# Patient Record
Sex: MALE | Race: WHITE | NOT HISPANIC OR LATINO | Employment: FULL TIME | URBAN - METROPOLITAN AREA
[De-identification: names, ages, dates, MRNs, and addresses within clinical notes are randomized per-mention and may not be internally consistent; named-entity substitution may affect disease eponyms.]

---

## 2018-01-15 ENCOUNTER — HOSPITAL ENCOUNTER (INPATIENT)
Facility: HOSPITAL | Age: 58
LOS: 2 days | Discharge: HOME | End: 2018-01-17
Attending: EMERGENCY MEDICINE | Admitting: INTERNAL MEDICINE

## 2018-01-15 ENCOUNTER — APPOINTMENT (OUTPATIENT)
Dept: GENERAL RADIOLOGY | Facility: HOSPITAL | Age: 58
End: 2018-01-15

## 2018-01-15 ENCOUNTER — APPOINTMENT (OUTPATIENT)
Dept: GENERAL RADIOLOGY | Facility: HOSPITAL | Age: 58
End: 2018-01-15
Attending: INTERNAL MEDICINE

## 2018-01-15 DIAGNOSIS — J10.1 INFLUENZA A: Primary | ICD-10-CM

## 2018-01-15 DIAGNOSIS — R13.10 ODYNOPHAGIA: ICD-10-CM

## 2018-01-15 DIAGNOSIS — K20.90 ESOPHAGITIS: ICD-10-CM

## 2018-01-15 PROBLEM — A41.9 SEPSIS: Status: ACTIVE | Noted: 2018-01-15

## 2018-01-15 PROBLEM — R13.0 INABILITY TO SWALLOW: Status: ACTIVE | Noted: 2018-01-15

## 2018-01-15 PROBLEM — K22.70 BARRETT ESOPHAGUS: Status: ACTIVE | Noted: 2018-01-15

## 2018-01-15 LAB
ALBUMIN SERPL-MCNC: 4.4 G/DL (ref 3.2–4.8)
ALBUMIN/GLOB SERPL: 1.7 G/DL (ref 1.5–2.5)
ALP SERPL-CCNC: 132 U/L (ref 25–100)
ALT SERPL W P-5'-P-CCNC: 85 U/L (ref 7–40)
ANION GAP SERPL CALCULATED.3IONS-SCNC: 6 MMOL/L (ref 3–11)
AST SERPL-CCNC: 43 U/L (ref 0–33)
BASOPHILS # BLD AUTO: 0.01 10*3/MM3 (ref 0–0.2)
BASOPHILS NFR BLD AUTO: 0.1 % (ref 0–1)
BILIRUB SERPL-MCNC: 0.8 MG/DL (ref 0.3–1.2)
BILIRUB UR QL STRIP: NEGATIVE
BUN BLD-MCNC: 12 MG/DL (ref 9–23)
BUN/CREAT SERPL: 12 (ref 7–25)
CA-I SERPL ISE-MCNC: 1.22 MMOL/L (ref 1.12–1.32)
CALCIUM SPEC-SCNC: 9.2 MG/DL (ref 8.7–10.4)
CHLORIDE SERPL-SCNC: 105 MMOL/L (ref 99–109)
CLARITY UR: CLEAR
CO2 SERPL-SCNC: 24 MMOL/L (ref 20–31)
COLOR UR: YELLOW
CREAT BLD-MCNC: 1 MG/DL (ref 0.6–1.3)
D-LACTATE SERPL-SCNC: 0.7 MMOL/L (ref 0.5–2)
DEPRECATED RDW RBC AUTO: 43.7 FL (ref 37–54)
EOSINOPHIL # BLD AUTO: 0.01 10*3/MM3 (ref 0–0.3)
EOSINOPHIL NFR BLD AUTO: 0.1 % (ref 0–3)
ERYTHROCYTE [DISTWIDTH] IN BLOOD BY AUTOMATED COUNT: 13.4 % (ref 11.3–14.5)
FLUAV AG NPH QL: POSITIVE
FLUBV AG NPH QL IA: NEGATIVE
GFR SERPL CREATININE-BSD FRML MDRD: 77 ML/MIN/1.73
GLOBULIN UR ELPH-MCNC: 2.6 GM/DL
GLUCOSE BLD-MCNC: 127 MG/DL (ref 70–100)
GLUCOSE UR STRIP-MCNC: NEGATIVE MG/DL
HBA1C MFR BLD: 6.2 % (ref 4.8–5.6)
HCT VFR BLD AUTO: 42.9 % (ref 38.9–50.9)
HGB BLD-MCNC: 14.6 G/DL (ref 13.1–17.5)
HGB UR QL STRIP.AUTO: NEGATIVE
IMM GRANULOCYTES # BLD: 0.02 10*3/MM3 (ref 0–0.03)
IMM GRANULOCYTES NFR BLD: 0.2 % (ref 0–0.6)
KETONES UR QL STRIP: ABNORMAL
LEUKOCYTE ESTERASE UR QL STRIP.AUTO: NEGATIVE
LYMPHOCYTES # BLD AUTO: 0.82 10*3/MM3 (ref 0.6–4.8)
LYMPHOCYTES NFR BLD AUTO: 8.9 % (ref 24–44)
MAGNESIUM SERPL-MCNC: 1.7 MG/DL (ref 1.3–2.7)
MCH RBC QN AUTO: 30.2 PG (ref 27–31)
MCHC RBC AUTO-ENTMCNC: 34 G/DL (ref 32–36)
MCV RBC AUTO: 88.6 FL (ref 80–99)
MONOCYTES # BLD AUTO: 0.9 10*3/MM3 (ref 0–1)
MONOCYTES NFR BLD AUTO: 9.7 % (ref 0–12)
NEUTROPHILS # BLD AUTO: 7.48 10*3/MM3 (ref 1.5–8.3)
NEUTROPHILS NFR BLD AUTO: 81 % (ref 41–71)
NITRITE UR QL STRIP: NEGATIVE
PH UR STRIP.AUTO: 7 [PH] (ref 5–8)
PLATELET # BLD AUTO: 201 10*3/MM3 (ref 150–450)
PMV BLD AUTO: 9.9 FL (ref 6–12)
POTASSIUM BLD-SCNC: 3.5 MMOL/L (ref 3.5–5.5)
PROT SERPL-MCNC: 7 G/DL (ref 5.7–8.2)
PROT UR QL STRIP: NEGATIVE
RBC # BLD AUTO: 4.84 10*6/MM3 (ref 4.2–5.76)
S PYO AG THROAT QL: NEGATIVE
SODIUM BLD-SCNC: 135 MMOL/L (ref 132–146)
SP GR UR STRIP: 1.02 (ref 1–1.03)
TSH SERPL DL<=0.05 MIU/L-ACNC: 0.15 MIU/ML (ref 0.35–5.35)
UROBILINOGEN UR QL STRIP: ABNORMAL
WBC NRBC COR # BLD: 9.24 10*3/MM3 (ref 3.5–10.8)

## 2018-01-15 PROCEDURE — 93005 ELECTROCARDIOGRAM TRACING: CPT | Performed by: NURSE PRACTITIONER

## 2018-01-15 PROCEDURE — 87804 INFLUENZA ASSAY W/OPTIC: CPT | Performed by: EMERGENCY MEDICINE

## 2018-01-15 PROCEDURE — 86645 CMV ANTIBODY IGM: CPT | Performed by: INTERNAL MEDICINE

## 2018-01-15 PROCEDURE — 87880 STREP A ASSAY W/OPTIC: CPT | Performed by: EMERGENCY MEDICINE

## 2018-01-15 PROCEDURE — 86644 CMV ANTIBODY: CPT | Performed by: INTERNAL MEDICINE

## 2018-01-15 PROCEDURE — 25010000002 HYDROMORPHONE PER 4 MG: Performed by: INTERNAL MEDICINE

## 2018-01-15 PROCEDURE — 87040 BLOOD CULTURE FOR BACTERIA: CPT | Performed by: EMERGENCY MEDICINE

## 2018-01-15 PROCEDURE — 25010000002 FLUCONAZOLE PER 200 MG: Performed by: INTERNAL MEDICINE

## 2018-01-15 PROCEDURE — 71046 X-RAY EXAM CHEST 2 VIEWS: CPT

## 2018-01-15 PROCEDURE — 87081 CULTURE SCREEN ONLY: CPT | Performed by: EMERGENCY MEDICINE

## 2018-01-15 PROCEDURE — 99285 EMERGENCY DEPT VISIT HI MDM: CPT

## 2018-01-15 PROCEDURE — 83605 ASSAY OF LACTIC ACID: CPT | Performed by: EMERGENCY MEDICINE

## 2018-01-15 PROCEDURE — 25010000002 PIPERACILLIN SOD-TAZOBACTAM PER 1 G: Performed by: NURSE PRACTITIONER

## 2018-01-15 PROCEDURE — 25010000002 VANCOMYCIN 10 G RECONSTITUTED SOLUTION

## 2018-01-15 PROCEDURE — 25010000002 ONDANSETRON PER 1 MG: Performed by: EMERGENCY MEDICINE

## 2018-01-15 PROCEDURE — 80053 COMPREHEN METABOLIC PANEL: CPT | Performed by: EMERGENCY MEDICINE

## 2018-01-15 PROCEDURE — 25010000002 PROMETHAZINE PER 50 MG: Performed by: FAMILY MEDICINE

## 2018-01-15 PROCEDURE — 25010000002 FENTANYL CITRATE (PF) 100 MCG/2ML SOLUTION: Performed by: EMERGENCY MEDICINE

## 2018-01-15 PROCEDURE — 93010 ELECTROCARDIOGRAM REPORT: CPT | Performed by: INTERNAL MEDICINE

## 2018-01-15 PROCEDURE — 99222 1ST HOSP IP/OBS MODERATE 55: CPT | Performed by: INTERNAL MEDICINE

## 2018-01-15 PROCEDURE — 25010000002 CEFTRIAXONE PER 250 MG: Performed by: INTERNAL MEDICINE

## 2018-01-15 PROCEDURE — 85025 COMPLETE CBC W/AUTO DIFF WBC: CPT | Performed by: EMERGENCY MEDICINE

## 2018-01-15 PROCEDURE — 25010000002 METHYLPREDNISOLONE PER 125 MG: Performed by: INTERNAL MEDICINE

## 2018-01-15 PROCEDURE — 83036 HEMOGLOBIN GLYCOSYLATED A1C: CPT | Performed by: INTERNAL MEDICINE

## 2018-01-15 PROCEDURE — 25010000002 METHYLPREDNISOLONE PER 125 MG: Performed by: EMERGENCY MEDICINE

## 2018-01-15 PROCEDURE — 84443 ASSAY THYROID STIM HORMONE: CPT | Performed by: NURSE PRACTITIONER

## 2018-01-15 PROCEDURE — 87070 CULTURE OTHR SPECIMN AEROBIC: CPT | Performed by: INTERNAL MEDICINE

## 2018-01-15 PROCEDURE — 92610 EVALUATE SWALLOWING FUNCTION: CPT

## 2018-01-15 PROCEDURE — 25010000002 KETOROLAC TROMETHAMINE PER 15 MG: Performed by: EMERGENCY MEDICINE

## 2018-01-15 PROCEDURE — 70360 X-RAY EXAM OF NECK: CPT

## 2018-01-15 PROCEDURE — 81003 URINALYSIS AUTO W/O SCOPE: CPT | Performed by: EMERGENCY MEDICINE

## 2018-01-15 PROCEDURE — 92612 ENDOSCOPY SWALLOW (FEES) VID: CPT

## 2018-01-15 PROCEDURE — 82330 ASSAY OF CALCIUM: CPT | Performed by: NURSE PRACTITIONER

## 2018-01-15 PROCEDURE — 87205 SMEAR GRAM STAIN: CPT | Performed by: INTERNAL MEDICINE

## 2018-01-15 PROCEDURE — 83735 ASSAY OF MAGNESIUM: CPT | Performed by: NURSE PRACTITIONER

## 2018-01-15 RX ORDER — CEFTRIAXONE SODIUM 1 G/50ML
1 INJECTION, SOLUTION INTRAVENOUS EVERY 24 HOURS
Status: DISCONTINUED | OUTPATIENT
Start: 2018-01-16 | End: 2018-01-17 | Stop reason: HOSPADM

## 2018-01-15 RX ORDER — PANTOPRAZOLE SODIUM 40 MG/10ML
40 INJECTION, POWDER, LYOPHILIZED, FOR SOLUTION INTRAVENOUS EVERY 12 HOURS SCHEDULED
Status: DISCONTINUED | OUTPATIENT
Start: 2018-01-15 | End: 2018-01-17 | Stop reason: HOSPADM

## 2018-01-15 RX ORDER — POTASSIUM CHLORIDE 750 MG/1
40 CAPSULE, EXTENDED RELEASE ORAL AS NEEDED
Status: DISCONTINUED | OUTPATIENT
Start: 2018-01-15 | End: 2018-01-17 | Stop reason: HOSPADM

## 2018-01-15 RX ORDER — ACETAMINOPHEN 650 MG/1
650 SUPPOSITORY RECTAL ONCE
Status: COMPLETED | OUTPATIENT
Start: 2018-01-15 | End: 2018-01-15

## 2018-01-15 RX ORDER — FAMOTIDINE 10 MG/ML
10 INJECTION, SOLUTION INTRAVENOUS EVERY 12 HOURS PRN
Status: DISCONTINUED | OUTPATIENT
Start: 2018-01-15 | End: 2018-01-17 | Stop reason: HOSPADM

## 2018-01-15 RX ORDER — HYDROMORPHONE HYDROCHLORIDE 1 MG/ML
0.5 INJECTION, SOLUTION INTRAMUSCULAR; INTRAVENOUS; SUBCUTANEOUS
Status: DISCONTINUED | OUTPATIENT
Start: 2018-01-15 | End: 2018-01-17 | Stop reason: HOSPADM

## 2018-01-15 RX ORDER — OSELTAMIVIR PHOSPHATE 6 MG/ML
75 FOR SUSPENSION ORAL EVERY 12 HOURS SCHEDULED
Status: DISCONTINUED | OUTPATIENT
Start: 2018-01-15 | End: 2018-01-17 | Stop reason: HOSPADM

## 2018-01-15 RX ORDER — ACETAMINOPHEN 650 MG/1
650 SUPPOSITORY RECTAL EVERY 4 HOURS PRN
Status: DISCONTINUED | OUTPATIENT
Start: 2018-01-15 | End: 2018-01-17 | Stop reason: HOSPADM

## 2018-01-15 RX ORDER — ONDANSETRON 2 MG/ML
4 INJECTION INTRAMUSCULAR; INTRAVENOUS EVERY 6 HOURS PRN
Status: DISCONTINUED | OUTPATIENT
Start: 2018-01-15 | End: 2018-01-17 | Stop reason: HOSPADM

## 2018-01-15 RX ORDER — ONDANSETRON 2 MG/ML
4 INJECTION INTRAMUSCULAR; INTRAVENOUS ONCE
Status: COMPLETED | OUTPATIENT
Start: 2018-01-15 | End: 2018-01-15

## 2018-01-15 RX ORDER — SODIUM CHLORIDE 0.9 % (FLUSH) 0.9 %
10 SYRINGE (ML) INJECTION AS NEEDED
Status: DISCONTINUED | OUTPATIENT
Start: 2018-01-15 | End: 2018-01-17 | Stop reason: HOSPADM

## 2018-01-15 RX ORDER — KETOROLAC TROMETHAMINE 15 MG/ML
15 INJECTION, SOLUTION INTRAMUSCULAR; INTRAVENOUS ONCE
Status: COMPLETED | OUTPATIENT
Start: 2018-01-15 | End: 2018-01-15

## 2018-01-15 RX ORDER — PROMETHAZINE HYDROCHLORIDE 12.5 MG/1
12.5 TABLET ORAL EVERY 6 HOURS PRN
Status: DISCONTINUED | OUTPATIENT
Start: 2018-01-15 | End: 2018-01-17 | Stop reason: HOSPADM

## 2018-01-15 RX ORDER — POTASSIUM CHLORIDE 1.5 G/1.77G
40 POWDER, FOR SOLUTION ORAL AS NEEDED
Status: DISCONTINUED | OUTPATIENT
Start: 2018-01-15 | End: 2018-01-17 | Stop reason: HOSPADM

## 2018-01-15 RX ORDER — PANTOPRAZOLE SODIUM 40 MG/10ML
40 INJECTION, POWDER, LYOPHILIZED, FOR SOLUTION INTRAVENOUS EVERY 12 HOURS SCHEDULED
Status: DISCONTINUED | OUTPATIENT
Start: 2018-01-15 | End: 2018-01-15

## 2018-01-15 RX ORDER — ACETAMINOPHEN 500 MG
1000 TABLET ORAL ONCE
Status: DISCONTINUED | OUTPATIENT
Start: 2018-01-15 | End: 2018-01-15

## 2018-01-15 RX ORDER — DOCUSATE SODIUM 100 MG/1
100 CAPSULE, LIQUID FILLED ORAL 2 TIMES DAILY PRN
Status: DISCONTINUED | OUTPATIENT
Start: 2018-01-15 | End: 2018-01-17 | Stop reason: HOSPADM

## 2018-01-15 RX ORDER — METHYLPREDNISOLONE SODIUM SUCCINATE 125 MG/2ML
125 INJECTION, POWDER, LYOPHILIZED, FOR SOLUTION INTRAMUSCULAR; INTRAVENOUS ONCE
Status: COMPLETED | OUTPATIENT
Start: 2018-01-15 | End: 2018-01-15

## 2018-01-15 RX ORDER — POTASSIUM CHLORIDE 7.45 MG/ML
10 INJECTION INTRAVENOUS
Status: DISCONTINUED | OUTPATIENT
Start: 2018-01-15 | End: 2018-01-17 | Stop reason: HOSPADM

## 2018-01-15 RX ORDER — FLUCONAZOLE 2 MG/ML
200 INJECTION, SOLUTION INTRAVENOUS DAILY
Status: DISCONTINUED | OUTPATIENT
Start: 2018-01-15 | End: 2018-01-17 | Stop reason: HOSPADM

## 2018-01-15 RX ORDER — SODIUM CHLORIDE 0.9 % (FLUSH) 0.9 %
1-10 SYRINGE (ML) INJECTION AS NEEDED
Status: DISCONTINUED | OUTPATIENT
Start: 2018-01-15 | End: 2018-01-17 | Stop reason: HOSPADM

## 2018-01-15 RX ORDER — ALUMINA, MAGNESIA, AND SIMETHICONE 2400; 2400; 240 MG/30ML; MG/30ML; MG/30ML
15 SUSPENSION ORAL ONCE
Status: COMPLETED | OUTPATIENT
Start: 2018-01-15 | End: 2018-01-15

## 2018-01-15 RX ORDER — FENTANYL CITRATE 50 UG/ML
50 INJECTION, SOLUTION INTRAMUSCULAR; INTRAVENOUS
Status: COMPLETED | OUTPATIENT
Start: 2018-01-15 | End: 2018-01-15

## 2018-01-15 RX ORDER — BISACODYL 5 MG/1
5 TABLET, DELAYED RELEASE ORAL DAILY PRN
Status: DISCONTINUED | OUTPATIENT
Start: 2018-01-15 | End: 2018-01-17 | Stop reason: HOSPADM

## 2018-01-15 RX ORDER — CEFTRIAXONE SODIUM 1 G/50ML
1 INJECTION, SOLUTION INTRAVENOUS DAILY
Status: DISCONTINUED | OUTPATIENT
Start: 2018-01-15 | End: 2018-01-15

## 2018-01-15 RX ORDER — PROMETHAZINE HYDROCHLORIDE 25 MG/ML
12.5 INJECTION, SOLUTION INTRAMUSCULAR; INTRAVENOUS EVERY 6 HOURS PRN
Status: DISCONTINUED | OUTPATIENT
Start: 2018-01-15 | End: 2018-01-17 | Stop reason: HOSPADM

## 2018-01-15 RX ORDER — MAGNESIUM SULFATE HEPTAHYDRATE 40 MG/ML
2 INJECTION, SOLUTION INTRAVENOUS AS NEEDED
Status: DISCONTINUED | OUTPATIENT
Start: 2018-01-15 | End: 2018-01-17 | Stop reason: HOSPADM

## 2018-01-15 RX ORDER — METHYLPREDNISOLONE SODIUM SUCCINATE 125 MG/2ML
60 INJECTION, POWDER, LYOPHILIZED, FOR SOLUTION INTRAMUSCULAR; INTRAVENOUS EVERY 12 HOURS SCHEDULED
Status: DISCONTINUED | OUTPATIENT
Start: 2018-01-15 | End: 2018-01-15

## 2018-01-15 RX ORDER — ONDANSETRON 4 MG/1
4 TABLET, FILM COATED ORAL EVERY 6 HOURS PRN
Status: DISCONTINUED | OUTPATIENT
Start: 2018-01-15 | End: 2018-01-17 | Stop reason: HOSPADM

## 2018-01-15 RX ORDER — MAGNESIUM SULFATE HEPTAHYDRATE 40 MG/ML
4 INJECTION, SOLUTION INTRAVENOUS AS NEEDED
Status: DISCONTINUED | OUTPATIENT
Start: 2018-01-15 | End: 2018-01-17 | Stop reason: HOSPADM

## 2018-01-15 RX ORDER — OMEPRAZOLE 40 MG/1
40 CAPSULE, DELAYED RELEASE ORAL DAILY
COMMUNITY
End: 2019-05-13 | Stop reason: SDUPTHER

## 2018-01-15 RX ORDER — VANCOMYCIN HYDROCHLORIDE 1 G/200ML
1000 INJECTION, SOLUTION INTRAVENOUS EVERY 12 HOURS
Status: DISCONTINUED | OUTPATIENT
Start: 2018-01-16 | End: 2018-01-15

## 2018-01-15 RX ORDER — SODIUM CHLORIDE 9 MG/ML
125 INJECTION, SOLUTION INTRAVENOUS CONTINUOUS
Status: DISCONTINUED | OUTPATIENT
Start: 2018-01-15 | End: 2018-01-17 | Stop reason: HOSPADM

## 2018-01-15 RX ADMIN — METHYLPREDNISOLONE SODIUM SUCCINATE 125 MG: 125 INJECTION, POWDER, FOR SOLUTION INTRAMUSCULAR; INTRAVENOUS at 05:16

## 2018-01-15 RX ADMIN — ALUMINUM HYDROXIDE, MAGNESIUM HYDROXIDE, AND DIMETHICONE 15 ML: 400; 400; 40 SUSPENSION ORAL at 05:18

## 2018-01-15 RX ADMIN — FENTANYL CITRATE 50 MCG: 50 INJECTION INTRAMUSCULAR; INTRAVENOUS at 06:08

## 2018-01-15 RX ADMIN — HYDROMORPHONE HYDROCHLORIDE 0.5 MG: 2 INJECTION INTRAMUSCULAR; INTRAVENOUS; SUBCUTANEOUS at 12:53

## 2018-01-15 RX ADMIN — ACETAMINOPHEN 650 MG: 650 SUPPOSITORY RECTAL at 02:27

## 2018-01-15 RX ADMIN — FENTANYL CITRATE 50 MCG: 50 INJECTION INTRAMUSCULAR; INTRAVENOUS at 02:23

## 2018-01-15 RX ADMIN — SODIUM CHLORIDE 2448 ML: 9 INJECTION, SOLUTION INTRAVENOUS at 02:18

## 2018-01-15 RX ADMIN — FENTANYL CITRATE 50 MCG: 50 INJECTION INTRAMUSCULAR; INTRAVENOUS at 06:35

## 2018-01-15 RX ADMIN — SODIUM CHLORIDE 125 ML/HR: 9 INJECTION, SOLUTION INTRAVENOUS at 17:26

## 2018-01-15 RX ADMIN — HYDROMORPHONE HYDROCHLORIDE 0.5 MG: 2 INJECTION INTRAMUSCULAR; INTRAVENOUS; SUBCUTANEOUS at 10:36

## 2018-01-15 RX ADMIN — PANTOPRAZOLE SODIUM 40 MG: 40 INJECTION, POWDER, FOR SOLUTION INTRAVENOUS at 10:36

## 2018-01-15 RX ADMIN — LIDOCAINE HYDROCHLORIDE 15 ML: 20 SOLUTION ORAL; TOPICAL at 05:18

## 2018-01-15 RX ADMIN — PANTOPRAZOLE SODIUM 40 MG: 40 INJECTION, POWDER, FOR SOLUTION INTRAVENOUS at 20:43

## 2018-01-15 RX ADMIN — FLUCONAZOLE 200 MG: 200 INJECTION, SOLUTION INTRAVENOUS at 17:27

## 2018-01-15 RX ADMIN — METHYLPREDNISOLONE SODIUM SUCCINATE 60 MG: 125 INJECTION, POWDER, FOR SOLUTION INTRAMUSCULAR; INTRAVENOUS at 12:03

## 2018-01-15 RX ADMIN — OSELTAMIVIR PHOSPHATE 75 MG: 6 POWDER, FOR SUSPENSION ORAL at 20:43

## 2018-01-15 RX ADMIN — VANCOMYCIN HYDROCHLORIDE 1750 MG: 10 INJECTION, POWDER, LYOPHILIZED, FOR SOLUTION INTRAVENOUS at 10:36

## 2018-01-15 RX ADMIN — TAZOBACTAM SODIUM AND PIPERACILLIN SODIUM 3.38 G: 375; 3 INJECTION, SOLUTION INTRAVENOUS at 12:03

## 2018-01-15 RX ADMIN — HYDROMORPHONE HYDROCHLORIDE 0.5 MG: 2 INJECTION INTRAMUSCULAR; INTRAVENOUS; SUBCUTANEOUS at 15:23

## 2018-01-15 RX ADMIN — KETOROLAC TROMETHAMINE 15 MG: 15 INJECTION, SOLUTION INTRAMUSCULAR; INTRAVENOUS at 02:21

## 2018-01-15 RX ADMIN — OSELTAMIVIR PHOSPHATE 75 MG: 6 POWDER, FOR SUSPENSION ORAL at 15:24

## 2018-01-15 RX ADMIN — CEFTRIAXONE SODIUM 1 G: 1 INJECTION, SOLUTION INTRAVENOUS at 10:36

## 2018-01-15 RX ADMIN — ONDANSETRON 4 MG: 2 INJECTION, SOLUTION INTRAMUSCULAR; INTRAVENOUS at 02:20

## 2018-01-15 RX ADMIN — MAGNESIUM SULFATE HEPTAHYDRATE 4 G: 40 INJECTION, SOLUTION INTRAVENOUS at 15:24

## 2018-01-15 RX ADMIN — PROMETHAZINE HYDROCHLORIDE 12.5 MG: 25 INJECTION INTRAMUSCULAR; INTRAVENOUS at 19:34

## 2018-01-15 RX ADMIN — HYDROMORPHONE HYDROCHLORIDE 0.5 MG: 2 INJECTION INTRAMUSCULAR; INTRAVENOUS; SUBCUTANEOUS at 17:26

## 2018-01-15 RX ADMIN — SODIUM CHLORIDE 125 ML/HR: 9 INJECTION, SOLUTION INTRAVENOUS at 08:08

## 2018-01-15 RX ADMIN — TOPICAL ANESTHETIC 2 SPRAY: 200 SPRAY DENTAL; PERIODONTAL at 05:24

## 2018-01-15 NOTE — ED PROVIDER NOTES
Subjective   HPI Comments: Kayode Cline is a 57 y.o. male with a hx of barretts esophagus who presents to the ED with c/o sore throat with onset 3 days ago. The patient reports that this has been worsening since onset and he is having difficulty speaking, swallowing, and breathing as a result. He began having a fever tonight at 1800 which prompted his visit to the ED. The patient also complains of some nausea, and a cough which exacerbates his sore throat, but denies myalgias, vomiting, or any other acute complaints at this time.     Patient is a 57 y.o. male presenting with sore throat.   History provided by:  Patient  Sore Throat   Location:  Generalized  Quality:  Sore  Severity:  Moderate  Onset quality:  Gradual  Duration:  3 days  Timing:  Constant  Progression:  Worsening  Chronicity:  New  Relieved by:  None tried  Worsened by:  Swallowing, drinking and eating (Coughing)  Ineffective treatments:  None tried  Associated symptoms: chills, fever, shortness of breath, trouble swallowing and voice change        Review of Systems   Constitutional: Positive for chills and fever.   HENT: Positive for sore throat, trouble swallowing and voice change.    Respiratory: Positive for shortness of breath.    Gastrointestinal: Positive for nausea. Negative for vomiting.   Musculoskeletal: Negative for myalgias.   All other systems reviewed and are negative.      Past Medical History:   Diagnosis Date   • Delgado's esophagus        No Known Allergies    Past Surgical History:   Procedure Laterality Date   • ENDOSCOPY         History reviewed. No pertinent family history.    Social History     Social History   • Marital status:      Spouse name: N/A   • Number of children: N/A   • Years of education: N/A     Social History Main Topics   • Smoking status: Never Smoker   • Smokeless tobacco: None   • Alcohol use No   • Drug use: No   • Sexual activity: Not Asked     Other Topics Concern   • None     Social History  Narrative   • None         Objective   Physical Exam   Constitutional: He is oriented to person, place, and time. He appears well-developed and well-nourished. No distress.   The patient appears uncomfortable.   HENT:   Head: Normocephalic and atraumatic.   Right Ear: External ear normal.   Nose: Nose normal.   Mouth/Throat: Posterior oropharyngeal erythema present.   Cerumen impaction in the left external canal making it difficult to visualize the TM   Eyes: Conjunctivae are normal. No scleral icterus.   Neck: Normal range of motion. Neck supple.   Mild tenderness with anterior cervical palpation. No significant lymphadenopathy.   Cardiovascular: Regular rhythm, normal heart sounds and intact distal pulses.  Tachycardia present.    No murmur heard.  Pulmonary/Chest: Effort normal and breath sounds normal. No respiratory distress.   Abdominal: Soft. Bowel sounds are normal. There is no tenderness (No epigastric tenderness).   Musculoskeletal: Normal range of motion. He exhibits no edema.   Normal extremities.   Neurological: He is alert and oriented to person, place, and time.   Skin: Skin is warm and dry.   Psychiatric: He has a normal mood and affect. His behavior is normal.   Nursing note and vitals reviewed.      Procedures         ED Course  ED Course     Recent Results (from the past 24 hour(s))   Comprehensive Metabolic Panel    Collection Time: 01/15/18  2:25 AM   Result Value Ref Range    Glucose 127 (H) 70 - 100 mg/dL    BUN 12 9 - 23 mg/dL    Creatinine 1.00 0.60 - 1.30 mg/dL    Sodium 135 132 - 146 mmol/L    Potassium 3.5 3.5 - 5.5 mmol/L    Chloride 105 99 - 109 mmol/L    CO2 24.0 20.0 - 31.0 mmol/L    Calcium 9.2 8.7 - 10.4 mg/dL    Total Protein 7.0 5.7 - 8.2 g/dL    Albumin 4.40 3.20 - 4.80 g/dL    ALT (SGPT) 85 (H) 7 - 40 U/L    AST (SGOT) 43 (H) 0 - 33 U/L    Alkaline Phosphatase 132 (H) 25 - 100 U/L    Total Bilirubin 0.8 0.3 - 1.2 mg/dL    eGFR Non African Amer 77 >60 mL/min/1.73    Globulin 2.6  gm/dL    A/G Ratio 1.7 1.5 - 2.5 g/dL    BUN/Creatinine Ratio 12.0 7.0 - 25.0    Anion Gap 6.0 3.0 - 11.0 mmol/L   CBC Auto Differential    Collection Time: 01/15/18  2:25 AM   Result Value Ref Range    WBC 9.24 3.50 - 10.80 10*3/mm3    RBC 4.84 4.20 - 5.76 10*6/mm3    Hemoglobin 14.6 13.1 - 17.5 g/dL    Hematocrit 42.9 38.9 - 50.9 %    MCV 88.6 80.0 - 99.0 fL    MCH 30.2 27.0 - 31.0 pg    MCHC 34.0 32.0 - 36.0 g/dL    RDW 13.4 11.3 - 14.5 %    RDW-SD 43.7 37.0 - 54.0 fl    MPV 9.9 6.0 - 12.0 fL    Platelets 201 150 - 450 10*3/mm3    Neutrophil % 81.0 (H) 41.0 - 71.0 %    Lymphocyte % 8.9 (L) 24.0 - 44.0 %    Monocyte % 9.7 0.0 - 12.0 %    Eosinophil % 0.1 0.0 - 3.0 %    Basophil % 0.1 0.0 - 1.0 %    Immature Grans % 0.2 0.0 - 0.6 %    Neutrophils, Absolute 7.48 1.50 - 8.30 10*3/mm3    Lymphocytes, Absolute 0.82 0.60 - 4.80 10*3/mm3    Monocytes, Absolute 0.90 0.00 - 1.00 10*3/mm3    Eosinophils, Absolute 0.01 0.00 - 0.30 10*3/mm3    Basophils, Absolute 0.01 0.00 - 0.20 10*3/mm3    Immature Grans, Absolute 0.02 0.00 - 0.03 10*3/mm3   Influenza Antigen, Rapid - Swab, Nasopharynx    Collection Time: 01/15/18  2:29 AM   Result Value Ref Range    Influenza A Ag, EIA Positive (A) Negative    Influenza B Ag, EIA Negative Negative   Rapid Strep A Screen - Swab, Throat    Collection Time: 01/15/18  2:29 AM   Result Value Ref Range    Strep A Ag Negative Negative   Urinalysis With / Culture If Indicated - Urine, Clean Catch    Collection Time: 01/15/18  3:17 AM   Result Value Ref Range    Color, UA Yellow Yellow, Straw    Appearance, UA Clear Clear    pH, UA 7.0 5.0 - 8.0    Specific Gravity, UA 1.020 1.001 - 1.030    Glucose, UA Negative Negative    Ketones, UA 80 mg/dL (3+) (A) Negative    Bilirubin, UA Negative Negative    Blood, UA Negative Negative    Protein, UA Negative Negative    Leuk Esterase, UA Negative Negative    Nitrite, UA Negative Negative    Urobilinogen, UA 0.2 E.U./dL 0.2 - 1.0 E.U./dL     Note: In  addition to lab results from this visit, the labs listed above may include labs taken at another facility or during a different encounter within the last 24 hours. Please correlate lab times with ED admission and discharge times for further clarification of the services performed during this visit.    XR Chest 2 View   Final Result     No acute findings.      THIS DOCUMENT HAS BEEN ELECTRONICALLY SIGNED BY PRABHJOT ANNE MD        Vitals:    01/15/18 0200 01/15/18 0300 01/15/18 0330 01/15/18 0331   BP: 148/97 123/77 133/84    BP Location:       Patient Position:       Pulse:    111   Resp:   24    Temp:    (!) 102.1 °F (38.9 °C)   TempSrc:       SpO2: 94%  95%    Weight:       Height:         Medications   sodium chloride 0.9 % flush 10 mL (not administered)   sodium chloride 0.9 % bolus 2,448 mL (2,448 mL Intravenous New Bag 1/15/18 0218)   fentaNYL citrate (PF) (SUBLIMAZE) injection 50 mcg (50 mcg Intravenous Given 1/15/18 0223)   ondansetron (ZOFRAN) injection 4 mg (4 mg Intravenous Given 1/15/18 0220)   ketorolac (TORADOL) injection 15 mg (15 mg Intravenous Given 1/15/18 0221)   acetaminophen (TYLENOL) suppository 650 mg (650 mg Rectal Given 1/15/18 0227)     ECG/EMG Results (last 24 hours)     ** No results found for the last 24 hours. **        Pt feels  Generally better following fever control and hydration.  Despite this, his throat pain persists and he remains unable to swallow medications or oral secretions.  No significant erythema and no edema is appreciated on visualization of the posterior pharynx.  I suspect that his esophagus is significantly inflamed and he will require admission for fluid, further evaluation and management.                MDM    Final diagnoses:   Influenza A   Odynophagia   Esophagitis       Documentation assistance provided by estela Feliciano.  Information recorded by the estela was done at my direction and has been verified and validated by me.     Edvin Feliciano  01/15/18  0158       Edvin Feliciano  01/15/18 0353       Dimitri Otto, DO  01/15/18 2053       Dimitri Otto, DO  01/15/18 2054

## 2018-01-15 NOTE — CONSULTS
INFECTIOUS DISEASE CONSULT/INITIAL HOSPITAL VISIT    Kayode Cline  1960  [id number]    Date of Consult: 1/15/2018    Admission Date: 1/15/2018      Requesting Provider: No ref. provider found  Evaluating Physician: Cisco Fung MD    Reason for Consultation: Influenza     History of present illness:    Patient is a 57 y.o. male who presented to the ED with complaints of sore throat, cough, and difficulty swallowing.  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 His Influenza A rapid screen  was positive. Admitting labs without leukocytosis,  And he has been febrile with a maximum temperature of 102.4.  Chest xray was clear.  Vancomycin and Rocephin initiated followed by intravenous Zosyn and we were consulted for antibiotic management.  He has been having difficulty swallowing Tamiflu tablets and Tamiflu liquid.    Past Medical History:   Diagnosis Date   • Delgado's esophagus    • Inability to swallow 1/15/2018       Past Surgical History:   Procedure Laterality Date   • ENDOSCOPY         History reviewed. No pertinent family history.    Social History     Social History   • Marital status:      Spouse name: N/A   • Number of children: N/A   • Years of education: N/A     Occupational History   • Not on file.     Social History Main Topics   • Smoking status: Never Smoker   • Smokeless tobacco: Never Used   • Alcohol use No   • Drug use: No   • Sexual activity: Defer     Other Topics Concern   • Not on file     Social History Narrative   • No narrative on file       No Known Allergies      Medication:    Current Facility-Administered Medications:   •  acetaminophen (TYLENOL) suppository 650 mg, 650 mg, Rectal, Q4H PRN, Agata Hammond, QAMAR  •  bisacodyl (DULCOLAX) EC tablet 5 mg, 5 mg, Oral, Daily PRN, QAMAR Soria  •  [START ON 1/16/2018] cefTRIAXone (ROCEPHIN) IVPB 1 g, 1 g,  Intravenous, Q24H, Darrick Yu MD  •  docusate sodium (COLACE) capsule 100 mg, 100 mg, Oral, BID PRN, QAMAR Soria  •  famotidine (PEPCID) injection 10 mg, 10 mg, Intravenous, Q12H PRN, QAMAR Soria  •  fluconazole (DIFLUCAN) IVPB 200 mg, 200 mg, Intravenous, Daily, Darrick Yu MD  •  HYDROmorphone (DILAUDID) injection 0.5 mg, 0.5 mg, Intravenous, Q2H PRN, Darrick Yu MD, 0.5 mg at 01/15/18 1523  •  lidocaine viscous (XYLOCAINE) 2 % mouth solution 5 mL, 5 mL, Mouth/Throat, Q3H PRN, Markus Johnson MD  •  Magnesium Sulfate 2 gram Bolus, followed by 8 gram infusion (total Mg dose 10 grams)- Mg less than or equal to 1mg/dL, 2 g, Intravenous, PRN **OR** Magnesium Sulfate 6 gram Infusion (2 gm x 3) -Mg 1.1 -1.5 mg/dL, 2 g, Intravenous, PRN **OR** magnesium sulfate 4 gram infusion- Mg 1.6-1.9 mg/dL, 4 g, Intravenous, PRN, QAMAR Soria, Last Rate: 25 mL/hr at 01/15/18 1524, 4 g at 01/15/18 1524  •  ondansetron (ZOFRAN) tablet 4 mg, 4 mg, Oral, Q6H PRN **OR** ondansetron (ZOFRAN) injection 4 mg, 4 mg, Intravenous, Q6H PRN, QAMAR Soria  •  oseltamivir (TAMIFLU) 6 MG/ML suspension 75 mg, 75 mg, Oral, Q12H, Darrick Yu MD, 75 mg at 01/15/18 1524  •  pantoprazole (PROTONIX) injection 40 mg, 40 mg, Intravenous, Q12H, Markus Johnson MD, 40 mg at 01/15/18 1036  •  Pharmacy to dose vancomycin, , Does not apply, Continuous PRN, Darrick Yu MD  •  potassium chloride (MICRO-K) CR capsule 40 mEq, 40 mEq, Oral, PRN **OR** potassium chloride (KLOR-CON) packet 40 mEq, 40 mEq, Oral, PRN **OR** potassium chloride 10 mEq in 100 mL IVPB, 10 mEq, Intravenous, Q1H PRN, Agata Hammond, APRN  •  sodium chloride 0.9 % flush 1-10 mL, 1-10 mL, Intravenous, PRN, Agata Hammond, APRN  •  Insert peripheral IV, , , Once **AND** sodium chloride 0.9 % flush 10 mL, 10 mL, Intravenous, PRN, Dimitri Otto,   •  sodium chloride 0.9 % infusion, 125 mL/hr, Intravenous,  Continuous, Markus Johnson MD, Last Rate: 125 mL/hr at 01/15/18 0808, 125 mL/hr at 01/15/18 0808    Antibiotics:  Anti-Infectives     Ordered     Dose/Rate Route Frequency Start Stop    01/15/18 1525  cefTRIAXone (ROCEPHIN) IVPB 1 g     Ordering Provider:  Darrick Yu MD    1 g  100 mL/hr over 30 Minutes Intravenous Every 24 Hours 18 1000 18 0959    01/15/18 1525  fluconazole (DIFLUCAN) IVPB 200 mg     Ordering Provider:  Darrick Yu MD    200 mg  over 60 Minutes Intravenous Daily 01/15/18 1600 18 0859    01/15/18 0828  oseltamivir (TAMIFLU) 6 MG/ML suspension 75 mg     Ordering Provider:  Darrick Yu MD    75 mg Oral Every 12 Hours Scheduled 01/15/18 0900 18 0859    01/15/18 0828  Pharmacy to dose vancomycin     Ordering Provider:  Darrick Yu MD     Does not apply Continuous PRN 01/15/18 0818 18 0817            Review of Systems:  Constitutional--+  Fever, chills or sweats.  Appetite poor , and no malaise. No fatigue.  HEENT-- No new vision, hearing  + sore throat and difficulty swallowing .  No epistaxis or oral sores.  Denies odynophagia . No headache, photophobia or neck stiffness.  CV-- No chest pain, palpitation or syncope  Resp-- No SOB/cough/Hemoptysis  GI- No nausea, vomiting, or diarrhea.  No hematochezia, melena, or hematemesis. Denies jaundice or chronic liver disease.  -- No dysuria, hematuria, or flank pain.  Denies hesitancy, urgency or flank pain.  Lymph- no swollen lymph nodes in neck/axilla or groin.   Heme- No active bruising or bleeding; no Hx of DVT or PE.  MS-- no swelling or pain in the bones or joints of arms/legs.  No new back pain.  Neuro-- No acute focal weakness or numbness in the arms or legs.  No seizures.  Skin--No rashes or lesions      Physical Exam:   Vital Signs  Temp (24hrs), Av °F (37.8 °C), Min:97.7 °F (36.5 °C), Max:102.4 °F (39.1 °C)    Temp  Min: 97.7 °F (36.5 °C)  Max: 102.4 °F (39.1 °C)  BP  Min: 108/99  Max:  158/93  Pulse  Min: 71  Max: 143  Resp  Min: 18  Max: 28  SpO2  Min: 92 %  Max: 98 %    GENERAL: Awake and alert, in no acute distress.   HEENT: Normocephalic, atraumatic.  PERRL. EOMI. No conjunctival injection. No icterus. posterior pharynx with erythema  evidence of thrush or exudate. No evidence of peridontal disease.    NECK: Supple without nuchal rigidity. No mass.  LYMPH: No cervical, axillary or inguinal lymphadenopathy.  HEART: RRR; No murmur, rubs, gallops.   LUNGS: Clear to auscultation bilaterally without wheezing, rales, rhonchi. Normal respiratory effort. Nonlabored. No dullness.  ABDOMEN: Soft, nontender, nondistended. Positive bowel sounds. No rebound or guarding. NO mass or HSM.  EXT:  No cyanosis, clubbing or edema. No cord.  : Genitalia generally unremarkable.  Without Malhotra catheter.  MSK: FROM without joint effusions noted arms/legs.    SKIN: Warm and dry without cutaneous eruptions on Inspection/palpation.    NEURO: Oriented to PPT. No focal deficits on motor/sensory exam at arms/legs.  PSYCHIATRIC: Normal insight and judgement. Cooperative with PE    Laboratory Data      Results from last 7 days  Lab Units 01/15/18  0225   WBC 10*3/mm3 9.24   HEMOGLOBIN g/dL 14.6   HEMATOCRIT % 42.9   PLATELETS 10*3/mm3 201       Results from last 7 days  Lab Units 01/15/18  0225   SODIUM mmol/L 135   POTASSIUM mmol/L 3.5   CHLORIDE mmol/L 105   CO2 mmol/L 24.0   BUN mg/dL 12   CREATININE mg/dL 1.00   GLUCOSE mg/dL 127*   CALCIUM mg/dL 9.2       Results from last 7 days  Lab Units 01/15/18  0225   ALK PHOS U/L 132*   BILIRUBIN mg/dL 0.8   ALT (SGPT) U/L 85*   AST (SGOT) U/L 43*               Results from last 7 days  Lab Units 01/15/18  0629   LACTATE mmol/L 0.7       Pathology from 2013:  Final Diagnosis  ESOPHAGEAL BIOPSIES:       Predominantly gastric-type mucosa without Delgado's metaplasia or  dysplasia.      Estimated Creatinine Clearance: 95.2 mL/min (by C-G formula based on Cr of 1).    Influenza A  positive ( rapid)   Microbiology:                                Radiology:  Imaging Results (last 72 hours)     Procedure Component Value Units Date/Time    XR Chest 2 View [[id number]] Collected:  01/15/18 0149     Updated:  01/15/18 0251    Narrative:       EXAM:    XR Chest, 2 Views    CLINICAL HISTORY:    57 years old, male; Signs and symptoms; Cough and fever; Symptoms not   specified; Additional info: Productive cough    TECHNIQUE:    Frontal and lateral views of the chest.    COMPARISON:    No relevant prior studies available.    FINDINGS:    Lungs: Linear right basilar opacity suggestive of subsegmental atelectasis or   scarring.  Otherwise clear.    Pleural space:  Unremarkable.  No pneumothorax.    Heart:  Unremarkable.    Mediastinum:  Unremarkable.    Bones/joints:  Unremarkable.      Impression:         No acute findings.    THIS DOCUMENT HAS BEEN ELECTRONICALLY SIGNED BY PRABHJOT ANNE MD            Impression:   1.  Influenza A-he has been having difficulty swallowing Tamiflu tablets and suspension.  His dysphagia now appears to be partially improved with steroid therapy.  I discussed his complex situation with clinical pharmacy today.  I investigated the potential use of intravenous Paramavir but it is not currently available.  We will have his Tamiflu placed in thickened liquid and hopefully he will be able to swallow this preparation.  2.  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 ENT consulted, at risk of aspiration.  A throat culture is currently pending to evaluate for beta streptococcal infection.  I will plan to give him intravenous ceftriaxone pending the throat culture result.  At the time of his FEES study today, he was noted to have thrush.  We will place him on intravenous fluconazole.  3.  Fever- secondary to above  4.  Elevated liver enzymes     PLAN/RECOMMENDATIONS:   Thank you for asking us to see Kayode Cline, I recommend the  followin. Throat culture-pending  2.  Intravenous fluconazole  3.  Intravenous ceftriaxone  4.  Dysphagia therapy-evaluation  5.  Tamiflu 75 mg by mouth twice a day-this will be placed in a thickened liquid     Dr. Fung has obtained the history, performed the physical exam and formulated the above treatment plan.     I discussed his complex situation in detail with Dr. Yu today.    Cisco Fung MD  1/15/2018  3:39 PM

## 2018-01-15 NOTE — PROGRESS NOTES
[facility] Medicine Services  PROGRESS NOTE    Patient Name: Kayode Cline  : 1960  MRN: [id number]    Date of Admission: 1/15/2018  Length of Stay: 0  Primary Care Physician: Bong Veloz MD    Subjective   Subjective     CC:  Fever, influenza, possible epiglottis    HPI:  Upper throat very sore, unable to swallow.     Review of Systems   HENT: Positive for sore throat and trouble swallowing.    Respiratory: Negative.    Cardiovascular: Negative.    Gastrointestinal: Negative.    Neurological: Negative.    Psychiatric/Behavioral: Negative.          Otherwise ROS is negative except as mentioned in the HPI.    Objective   Objective     Vital Signs:   Temp:  [99.1 °F (37.3 °C)-102.4 °F (39.1 °C)] 99.1 °F (37.3 °C)  Heart Rate:  [] 81  Resp:  [18-28] 18  BP: (108-158)/(77-99) 128/86        Physical Exam:  Constitutional -no acute distress, in bed  HEENT-posterior pharyngeal erythema bilaterally; no stridor  CV-RRR, S1 S2 normal, no m/r/g  Resp-CTAB, no wheezes, rhonchi or rales  Abd-soft, non-tender, non-distended, normo active bowel sounds  Ext-No lower extremity cyanosis, clubbing or edema bilaterally  Neuro-alert and oriented x 3, speech clear, moves all extremities   Psych-normal affect   Skin- No rash on exposed UE or LE bilaterally    Results Reviewed:  I have personally reviewed current lab, radiology, and data and agree.      Results from last 7 days  Lab Units 01/15/18  022   WBC 10*3/mm3 9.24   HEMOGLOBIN g/dL 14.6   HEMATOCRIT % 42.9   PLATELETS 10*3/mm3 201       Results from last 7 days  Lab Units 01/15/18  0225   SODIUM mmol/L 135   POTASSIUM mmol/L 3.5   CHLORIDE mmol/L 105   CO2 mmol/L 24.0   BUN mg/dL 12   CREATININE mg/dL 1.00   GLUCOSE mg/dL 127*   CALCIUM mg/dL 9.2   ALT (SGPT) U/L 85*   AST (SGOT) U/L 43*     No results found for: BNP  No results found for: PHART    Microbiology Results Abnormal     Procedure Component Value - Date/Time     Influenza Antigen, Rapid - Swab, Nasopharynx [[id number]]  (Abnormal) Collected:  01/15/18 0229    Lab Status:  Final result Specimen:  Swab from Nasopharynx Updated:  01/15/18 0303     Influenza A Ag, EIA Positive (A)     Influenza B Ag, EIA Negative    Rapid Strep A Screen - Swab, Throat [[id number]]  (Normal) Collected:  01/15/18 0229    Lab Status:  Final result Specimen:  Swab from Throat Updated:  01/15/18 0255     Strep A Ag Negative    Narrative:         Test performed by Direct Antigen Testing.          Imaging Results (last 24 hours)     Procedure Component Value Units Date/Time    XR Chest 2 View [[id number]] Collected:  01/15/18 0149     Updated:  01/15/18 0251    Narrative:       EXAM:    XR Chest, 2 Views    CLINICAL HISTORY:    57 years old, male; Signs and symptoms; Cough and fever; Symptoms not   specified; Additional info: Productive cough    TECHNIQUE:    Frontal and lateral views of the chest.    COMPARISON:    No relevant prior studies available.    FINDINGS:    Lungs: Linear right basilar opacity suggestive of subsegmental atelectasis or   scarring.  Otherwise clear.    Pleural space:  Unremarkable.  No pneumothorax.    Heart:  Unremarkable.    Mediastinum:  Unremarkable.    Bones/joints:  Unremarkable.      Impression:         No acute findings.    THIS DOCUMENT HAS BEEN ELECTRONICALLY SIGNED BY PRABHJOT ANNE MD             I have reviewed the medications.    ceftriaxone 1 g Intravenous Daily   oseltamivir 75 mg Oral Q12H   pantoprazole 40 mg Intravenous Q12H   vancomycin 1,750 mg Intravenous Once         Assessment/Plan   Assessment / Plan     Hospital Problem List     * (Principal)Inability to swallow    Influenza A    Sepsis    Odynophagia    Delgado esophagus             Brief Hospital Course to date:  Kayode Cline is a 57 y.o. male with several days sore throat presents with fever and inability to swallow. Patient says pain is in the upper part of his throat. Some drooling noted in the ED.  Fever 102 noted after arrival. Patient is influenza positive.      Assessment & Plan:    Influenza A  - tamiflu elixir    Possible epiglottitis  - several days sore throat, inability to swallow.  - received IV steroids in ED, will add Vancomycin and rocephin.  - requested ENT/ID evaluations.    Elevated LFTs  - etiology unclear, denies EtOH use; fatty liver?  - send hep panel  - will monitor, will need PCP for followup    Low TSH  - check Free T4    Renal insufficiency  - continue fluids, avoid nephrotoxins, CMP am    Hyperglycemia  - check a1c      DVT Prophylaxis:  Mechanical at least until ENT evaluation    CODE STATUS: Full Code    Disposition: I expect the patient to be discharged home in 1-2 days.    Darrick Yu MD  01/15/18  8:40 AM      Addendum 1/15/18 at 3:25 PM    - discussed with ENT, suggested FEES for further evaluation with lateral plain film of neck.    - FEES complete - patient with noted aspiration. Reviewed images with speech pathologist (unfortunately they cannot be downloaded to Epic currently), the images do show significant thrush on back of tongue and ayrtenoids. Epiglottis, however, does not appear red or edemetous.    - Discussed with ID Dr Fung, will continue IV Rocephin, add diflucan IV for thrush. Continue Tamiflu elixir for now with thickeners.          Darrick Yu MD  01/15/18  3:32 PM

## 2018-01-15 NOTE — PROGRESS NOTES
Discharge Planning Assessment  [facility]     Patient Name: Kayode Cline  MRN: [id number]  Today's Date: 1/15/2018    Admit Date: 1/15/2018          Discharge Needs Assessment       01/15/18 1127    Living Environment    Lives With spouse    Living Arrangements house    Stair Railings at Home none    Transportation Available car    Living Environment    Able to Return to Prior Living Arrangements yes    Discharge Needs Assessment    Concerns To Be Addressed no discharge needs identified    Anticipated Changes Related to Illness none    Equipment Currently Used at Home none    Equipment Needed After Discharge none            Discharge Plan       01/15/18 1127    Case Management/Social Work Plan    Plan Kayode Cline lives in [place] with his spouse Thalia Cline. His goal is to return to his home with his spouse.  social work is following for casemanagement needs.      Patient/Family In Agreement With Plan yes        Discharge Placement     No information found                Demographic Summary       01/15/18 1126    Primary Care Physician Information    Name Bong Olverachester            Functional Status       01/15/18 1126    Functional Status Current    Ambulation 0-->independent    Transferring 0-->independent    Toileting 0-->independent    Bathing 0-->independent    Dressing 0-->independent    Eating 0-->independent    Communication 0-->understands/communicates without difficulty    Swallowing (if score 2 or more for any item, consult Rehab Services) 0-->swallows foods/liquids without difficulty    Change in Functional Status Since Onset of Current Illness/Injury no    Functional Status Prior    Ambulation 0-->independent    Transferring 0-->independent    Toileting 0-->independent    Bathing 0-->independent    Dressing 0-->independent    Eating 0-->independent    Communication 0-->understands/communicates without difficulty    Swallowing 0-->swallows foods/liquids without difficulty    IADL    Medications  independent    Meal Preparation independent    Housekeeping independent    Laundry independent    Shopping independent    Oral Care independent    Activity Tolerance    Current Activity Limitations none    Usual Activity Tolerance good    Current Activity Tolerance good    Cognitive/Perceptual/Developmental    Current Mental Status/Cognitive Functioning no deficits noted    Recent Changes in Mental Status/Cognitive Functioning no changes            Psychosocial     None            Abuse/Neglect     None            Legal     None            Substance Abuse     None            Patient Forms     None          KRISH Sevilla

## 2018-01-15 NOTE — CONSULTS
INFECTIOUS DISEASE CONSULT/INITIAL HOSPITAL VISIT    Kayode Cline  1960  [id number]    Date of Consult: 1/15/2018    Admission Date: 1/15/2018      Requesting Provider: No ref. provider found  Evaluating Physician: QAMAR Lowery    Reason for Consultation: Influenza     History of present illness:    Patient is a 57 y.o. male who presented to the ED with complaints of sore throat, cough, and difficulty swallowing.  He states that he was hospitalized 8 years with the same problem, scoped at that time and was found to have Barrettes esophagus, and given Omeprazole. His Influenza A rapid screen  was positive. Admitting labs without leukocytosis,  And he has been febrile with a maximum temperature of 102.4.  Chest xray was clear.  Vancomycin and Rocephin initiated and we were consulted for antibiotic management.     Past Medical History:   Diagnosis Date   • Delgado's esophagus    • Inability to swallow 1/15/2018       Past Surgical History:   Procedure Laterality Date   • ENDOSCOPY         History reviewed. No pertinent family history.    Social History     Social History   • Marital status:      Spouse name: N/A   • Number of children: N/A   • Years of education: N/A     Occupational History   • Not on file.     Social History Main Topics   • Smoking status: Never Smoker   • Smokeless tobacco: Never Used   • Alcohol use No   • Drug use: No   • Sexual activity: Defer     Other Topics Concern   • Not on file     Social History Narrative   • No narrative on file       No Known Allergies      Medication:    Current Facility-Administered Medications:   •  acetaminophen (TYLENOL) suppository 650 mg, 650 mg, Rectal, Q4H PRN, QAMAR Soria  •  bisacodyl (DULCOLAX) EC tablet 5 mg, 5 mg, Oral, Daily PRN, QAMAR Soria  •  cefTRIAXone (ROCEPHIN) IVPB 1 g, 1 g, Intravenous, Daily, Darrick Yu MD, Last Rate: 100 mL/hr at 01/15/18 1036, 1 g at 01/15/18 1036  •  docusate sodium  (COLACE) capsule 100 mg, 100 mg, Oral, BID PRN, QAMAR Soria  •  famotidine (PEPCID) injection 10 mg, 10 mg, Intravenous, Q12H PRN, QAMAR Soria  •  HYDROmorphone (DILAUDID) injection 0.5 mg, 0.5 mg, Intravenous, Q2H PRN, Darrick Yu MD, 0.5 mg at 01/15/18 1036  •  lidocaine viscous (XYLOCAINE) 2 % mouth solution 5 mL, 5 mL, Mouth/Throat, Q3H PRN, Markus Johnson MD  •  Magnesium Sulfate 2 gram Bolus, followed by 8 gram infusion (total Mg dose 10 grams)- Mg less than or equal to 1mg/dL, 2 g, Intravenous, PRN **OR** Magnesium Sulfate 6 gram Infusion (2 gm x 3) -Mg 1.1 -1.5 mg/dL, 2 g, Intravenous, PRN **OR** magnesium sulfate 4 gram infusion- Mg 1.6-1.9 mg/dL, 4 g, Intravenous, PRN, QAMAR Soria  •  ondansetron (ZOFRAN) tablet 4 mg, 4 mg, Oral, Q6H PRN **OR** ondansetron (ZOFRAN) injection 4 mg, 4 mg, Intravenous, Q6H PRN, QAMAR Soria  •  oseltamivir (TAMIFLU) 6 MG/ML suspension 75 mg, 75 mg, Oral, Q12H, Darrick Yu MD  •  pantoprazole (PROTONIX) injection 40 mg, 40 mg, Intravenous, Q12H, Markus Johnson MD, 40 mg at 01/15/18 1036  •  [START ON 1/17/2018] Pharmacy Consult, , Does not apply, Once, Oz Guerrero [facility]  •  Pharmacy to dose vancomycin, , Does not apply, Continuous PRN, Darrick Yu MD  •  potassium chloride (MICRO-K) CR capsule 40 mEq, 40 mEq, Oral, PRN **OR** potassium chloride (KLOR-CON) packet 40 mEq, 40 mEq, Oral, PRN **OR** potassium chloride 10 mEq in 100 mL IVPB, 10 mEq, Intravenous, Q1H PRN, QAMAR Soria  •  sodium chloride 0.9 % flush 1-10 mL, 1-10 mL, Intravenous, PRN, QAMAR Soria  •  Insert peripheral IV, , , Once **AND** sodium chloride 0.9 % flush 10 mL, 10 mL, Intravenous, PRN, Dimitri Otto DO  •  sodium chloride 0.9 % infusion, 125 mL/hr, Intravenous, Continuous, Markus Johnson MD, Last Rate: 125 mL/hr at 01/15/18 0808, 125 mL/hr at 01/15/18 0808  •  [START ON 1/16/2018] vancomycin (VANCOCIN) in  iso-osmotic dextrose IVPB 1 g (premix) 200 mL, 1,000 mg, Intravenous, Q12H, Oz Guerrero RPH  •  vancomycin 1750 mg/500 mL 0.9% NS IVPB ([facility]), 1,750 mg, Intravenous, Once, Mario Irby RPH, 1,750 mg at 01/15/18 1036    Antibiotics:  Anti-Infectives     Ordered     Dose/Rate Route Frequency Start Stop    01/15/18 1043  vancomycin (VANCOCIN) in iso-osmotic dextrose IVPB 1 g (premix) 200 mL     Ordering Provider:  Oz Guerrero RPH    1,000 mg  over 60 Minutes Intravenous Every 12 Hours 01/16/18 0000 01/22/18 2359    01/15/18 0828  oseltamivir (TAMIFLU) 6 MG/ML suspension 75 mg     Ordering Provider:  Darrick Yu MD    75 mg Oral Every 12 Hours Scheduled 01/15/18 0900 01/20/18 0859    01/15/18 0828  cefTRIAXone (ROCEPHIN) IVPB 1 g     Ordering Provider:  Darrick Yu MD    1 g  100 mL/hr over 30 Minutes Intravenous Daily 01/15/18 0900 01/22/18 0859    01/15/18 0832  vancomycin 1750 mg/500 mL 0.9% NS IVPB ([facility])     Ordering Provider:  Mario Irby RPH    1,750 mg  over 120 Minutes Intravenous Once 01/15/18 0834      01/15/18 0828  Pharmacy to dose vancomycin     Ordering Provider:  Darrick Yu MD     Does not apply Continuous PRN 01/15/18 0818 01/22/18 0817            Review of Systems:  Constitutional--+  Fever, chills or sweats.  Appetite poor , and no malaise. No fatigue.  HEENT-- No new vision, hearing  + sore throat and difficulty swallowing .  No epistaxis or oral sores.  Denies odynophagia . No headache, photophobia or neck stiffness.  CV-- No chest pain, palpitation or syncope  Resp-- No SOB/cough/Hemoptysis  GI- No nausea, vomiting, or diarrhea.  No hematochezia, melena, or hematemesis. Denies jaundice or chronic liver disease.  -- No dysuria, hematuria, or flank pain.  Denies hesitancy, urgency or flank pain.  Lymph- no swollen lymph nodes in neck/axilla or groin.   Heme- No active bruising or bleeding; no Hx of DVT or PE.  MS-- no swelling or pain in the bones or joints of  arms/legs.  No new back pain.  Neuro-- No acute focal weakness or numbness in the arms or legs.  No seizures.  Skin--No rashes or lesions      Physical Exam:   Vital Signs  Temp (24hrs), Av.4 °F (38 °C), Min:97.8 °F (36.6 °C), Max:102.4 °F (39.1 °C)    Temp  Min: 97.8 °F (36.6 °C)  Max: 102.4 °F (39.1 °C)  BP  Min: 108/99  Max: 158/93  Pulse  Min: 79  Max: 143  Resp  Min: 18  Max: 28  SpO2  Min: 93 %  Max: 98 %    GENERAL: Awake and alert, in no acute distress.   HEENT: Normocephalic, atraumatic.  PERRL. EOMI. No conjunctival injection. No icterus. posterior pharynx with erythema  evidence of thrush or exudate. No evidence of peridontal disease.    NECK: Supple without nuchal rigidity. No mass.  LYMPH: No cervical, axillary or inguinal lymphadenopathy.  HEART: RRR; No murmur, rubs, gallops.   LUNGS: Clear to auscultation bilaterally without wheezing, rales, rhonchi. Normal respiratory effort. Nonlabored. No dullness.  ABDOMEN: Soft, nontender, nondistended. Positive bowel sounds. No rebound or guarding. NO mass or HSM.  EXT:  No cyanosis, clubbing or edema. No cord.  : Genitalia generally unremarkable.  Without Malhotra catheter.  MSK: FROM without joint effusions noted arms/legs.    SKIN: Warm and dry without cutaneous eruptions on Inspection/palpation.    NEURO: Oriented to PPT. No focal deficits on motor/sensory exam at arms/legs.  PSYCHIATRIC: Normal insight and judgement. Cooperative with PE    Laboratory Data      Results from last 7 days  Lab Units 01/15/18  0225   WBC 10*3/mm3 9.24   HEMOGLOBIN g/dL 14.6   HEMATOCRIT % 42.9   PLATELETS 10*3/mm3 201       Results from last 7 days  Lab Units 01/15/18  022   SODIUM mmol/L 135   POTASSIUM mmol/L 3.5   CHLORIDE mmol/L 105   CO2 mmol/L 24.0   BUN mg/dL 12   CREATININE mg/dL 1.00   GLUCOSE mg/dL 127*   CALCIUM mg/dL 9.2       Results from last 7 days  Lab Units 01/15/18  0225   ALK PHOS U/L 132*   BILIRUBIN mg/dL 0.8   ALT (SGPT) U/L 85*   AST (SGOT) U/L 43*                Results from last 7 days  Lab Units 01/15/18  0629   LACTATE mmol/L 0.7       Pathology from 2013:  Final Diagnosis  ESOPHAGEAL BIOPSIES:       Predominantly gastric-type mucosa without Delgado's metaplasia or  dysplasia.      Estimated Creatinine Clearance: 95.2 mL/min (by C-G formula based on Cr of 1).    Influenza A positive ( rapid)   Microbiology:                                Radiology:  Imaging Results (last 72 hours)     Procedure Component Value Units Date/Time    XR Chest 2 View [[id number]] Collected:  01/15/18 0149     Updated:  01/15/18 0251    Narrative:       EXAM:    XR Chest, 2 Views    CLINICAL HISTORY:    57 years old, male; Signs and symptoms; Cough and fever; Symptoms not   specified; Additional info: Productive cough    TECHNIQUE:    Frontal and lateral views of the chest.    COMPARISON:    No relevant prior studies available.    FINDINGS:    Lungs: Linear right basilar opacity suggestive of subsegmental atelectasis or   scarring.  Otherwise clear.    Pleural space:  Unremarkable.  No pneumothorax.    Heart:  Unremarkable.    Mediastinum:  Unremarkable.    Bones/joints:  Unremarkable.      Impression:         No acute findings.    THIS DOCUMENT HAS BEEN ELECTRONICALLY SIGNED BY PRABHJOT ANNE MD            Impression:   1.  Influenza A:  He is unable to swallow tablets and Tamiflu has been held  2.  Odynophagia, previously diagnosed with a CMV infection in the past and Delgado's esophagus. ENT consulted    3. Fever, secondary to above    PLAN/RECOMMENDATIONS:   Thank you for asking us to see Kayode Cline, I recommend the followin.  Vancomycin, Rocephin  2. ENT evaluation      Dr. Fung has obtained the history, performed the physical exam and formulated the above treatment plan.   Angle Hedrick, QAMAR  1/15/2018  10:44 AM

## 2018-01-15 NOTE — PLAN OF CARE
Problem: Pressure Ulcer Risk (Malcolm Scale) (Adult,Obstetrics,Pediatric)  Goal: Identify Related Risk Factors and Signs and Symptoms  Outcome: Ongoing (interventions implemented as appropriate)   01/15/18 1712   Pressure Ulcer Risk (Malcolm Scale)   Related Risk Factors (Pressure Ulcer Risk (Malcolm Scale)) age extremes;mobility impaired     Goal: Skin Integrity  Outcome: Ongoing (interventions implemented as appropriate)   01/15/18 1712   Pressure Ulcer Risk (Malcolm Scale) (Adult,Obstetrics,Pediatric)   Skin Integrity making progress toward outcome

## 2018-01-15 NOTE — H&P
"[facility] Medicine History and Physical    Primary Care Physician: Bong Veloz MD    Chief complaint   Sore throat.      History of Present Illness  57 Y M, presented to ed, with C/o sore throat, along with cough, with mucus.  Fever in last 12 hrs.  Also started C/o difficulty swallowing his own saliva 2nd to pain in last 12 hrs.  Describing as pain in his neck, no burning sensation, feels like food regurgitating.  No co of congestion, headache or myalgias.    Hx of reflux dis and Delgado's esophagus-last 8 yrs on PPI, last scope year back, which was Normal per pt, fup's with Dr. Retana.    Review of Systems   Complete ROS done, which is negative except as above and HPI.    Past Medical History:       Past Medical History:   Diagnosis Date   • Delgado's esophagus    • Inability to swallow 1/15/2018       Past Surgical History:  Past Surgical History:   Procedure Laterality Date   • ENDOSCOPY         Family History:   No hx of cad, or caner.    Social History:    reports that he has never smoked. He does not have any smokeless tobacco history on file. He reports that he does not drink alcohol or use illicit drugs.    Medications:    (Not in a hospital admission)  No Known Allergies        Physical Exam:    /83  Pulse 90  Temp (!) 100.7 °F (38.2 °C) (Oral)   Resp 18  Ht 175.3 cm (69\")  Wt 81.6 kg (180 lb)  SpO2 93%  BMI 26.58 kg/m2  VITALS-   AS ABOVE.  GENERAL- Not distressed, well nourished.  RS- CTA-BL, ,  No wheezing , no crackles, good effort.  CVS- s1s2 Regular, no murmur.  ABD- soft, non tender, not distended, no organomegaly. BS good.  EXT- no edema. Pulses + .  NEURO- AAO-3, power 5/5 in all ext, no gross sensory deficit, cranial nerves intact.  EYES- Conjunctivae are normal. Pupils are equal, round, and reactive to light. No scleral icterus.   ENT- no external ear nose lesions, mucosa moist,edematous oral , and post pharynx mucosa, no overt erythema, or pus or ulcers noted.  NECK-  No " tracheal deviation present. No thyromegaly present,No cervical lymphadenopathy.  JOINTS/MSK- no deformity, no swelling.  SKIN- no rash , warm to touch.  PSYCHIATRIC-  has a normal mood and affect.Thought content normal.           Results Reviewed:  I have personally reviewed current lab, radiology, and ekg . Data. Case D/w ED attending.  Lab Results   Component Value Date    GLUCOSE 127 (H) 01/15/2018    BUN 12 01/15/2018    CREATININE 1.00 01/15/2018    EGFRIFNONA 77 01/15/2018    BCR 12.0 01/15/2018    CO2 24.0 01/15/2018    CALCIUM 9.2 01/15/2018    ALBUMIN 4.40 01/15/2018    LABIL2 1.7 01/15/2018    AST 43 (H) 01/15/2018    ALT 85 (H) 01/15/2018     Lab Results   Component Value Date    WBC 9.24 01/15/2018    HGB 14.6 01/15/2018    HCT 42.9 01/15/2018    MCV 88.6 01/15/2018     01/15/2018     No results found for: CKTOTAL, CKMB, CKMBINDEX, TROPONINI, TROPONINT    Imaging Results (last 24 hours)     Procedure Component Value Units Date/Time    XR Chest 2 View [[id number]] Collected:  01/15/18 0149     Updated:  01/15/18 0251    Narrative:       EXAM:    XR Chest, 2 Views    CLINICAL HISTORY:    57 years old, male; Signs and symptoms; Cough and fever; Symptoms not   specified; Additional info: Productive cough    TECHNIQUE:    Frontal and lateral views of the chest.    COMPARISON:    No relevant prior studies available.    FINDINGS:    Lungs: Linear right basilar opacity suggestive of subsegmental atelectasis or   scarring.  Otherwise clear.    Pleural space:  Unremarkable.  No pneumothorax.    Heart:  Unremarkable.    Mediastinum:  Unremarkable.    Bones/joints:  Unremarkable.      Impression:         No acute findings.    THIS DOCUMENT HAS BEEN ELECTRONICALLY SIGNED BY PRABHJOT ANNE MD              OLD RECORDS REVIEWED BY ME.          Assessment/Plan    Sorethroat/trouble swallowing  -could not appreciated overy erythema, currently, but pt also got solumedrol 125 mg in ed.  -still cannot swallow saliva-GI  consult requested from ed-with Dr. Durham and likely scope in am.  -iv ppi for now.  -remote hx of throat/esophagus ulceration- send CMV igm.  Influenza A positive-with Neutrophilia.  Rapid strep is negative  -initially was tachycardic, febrile, meets sepsis criteria-fluids started, tamiflu not given since pt cannot swallow.    Mildly elevated LFT.  -as far pt can remember its chronic.  -continue to monitor.    DVT PXL  -jabari's/scds  -Lovenox-none likely pt dc after scope.      I discussed the patients findings and my recommendations with: patient/family.    I believe this patient meets observation status.

## 2018-01-15 NOTE — PLAN OF CARE
Problem: Patient Care Overview (Adult)  Goal: Plan of Care Review  Outcome: Ongoing (interventions implemented as appropriate)   01/15/18 0849   Coping/Psychosocial Response Interventions   Plan Of Care Reviewed With patient   Patient Care Overview   Progress progress towards functional goals is fair   Outcome Evaluation   Outcome Summary/Follow up Plan Complains of soreness to throat, states has improved since coming to ED. In bed resting at this time     Goal: Adult Individualization and Mutuality  Outcome: Ongoing (interventions implemented as appropriate)   01/15/18 0849   Individualization   Patient Specific Interventions Monitor pain q2h and prn       Problem: Infection, Risk/Actual (Adult)  Goal: Identify Related Risk Factors and Signs and Symptoms  Outcome: Ongoing (interventions implemented as appropriate)   01/15/18 0849   Infection, Risk/Actual   Infection, Risk/Actual: Related Risk Factors exposure to microbes   Signs and Symptoms (Infection, Risk/Actual) lab value changes     Goal: Infection Prevention/Resolution  Outcome: Ongoing (interventions implemented as appropriate)   01/15/18 0849   Infection, Risk/Actual (Adult)   Infection Prevention/Resolution making progress toward outcome

## 2018-01-15 NOTE — PROGRESS NOTES
"Pharmacy Consult-Vancomycin Dosing  Kayode Cline is a  57 y.o. male receiving vancomycin therapy.     Indication:Epiglottitis  Consulting Provider: Hospitalist  ID Consult: no    Goal Trough: 10-15 mcg/mL    Current Antimicrobial Therapy  Anti-Infectives       Ordered     Dose/Rate Route Frequency Start Stop      01/15/18 0828  oseltamivir (TAMIFLU) 6 MG/ML suspension 75 mg     Ordering Provider:  Darrick Yu MD    75 mg Oral Every 12 Hours Scheduled 01/15/18 0900 01/20/18 0859    01/15/18 0828  cefTRIAXone (ROCEPHIN) IVPB 1 g     Ordering Provider:  Darrick Yu MD    1 g  100 mL/hr over 30 Minutes Intravenous Daily 01/15/18 0900 01/22/18 0859    01/15/18 0832  vancomycin 1750 mg/500 mL 0.9% NS IVPB ([facility])     Ordering Provider:  Mario Irby RPH    1,750 mg  over 120 Minutes Intravenous Once 01/15/18 0834      01/15/18 0828  Pharmacy to dose vancomycin     Ordering Provider:  Darrick Yu MD     Does not apply Continuous PRN 01/15/18 0818 01/22/18 0817            Allergies  Allergies as of 01/15/2018   • (No Known Allergies)       Labs    Results from last 7 days   Lab Units 01/15/18  0225   BUN mg/dL 12   CREATININE mg/dL 1.00       Results from last 7 days   Lab Units 01/15/18  0225   WBC 10*3/mm3 9.24       Evaluation of Dosing    Ht - 175.3 cm (69\")  Wt - 81.6 kg (180 lb)    Estimated Creatinine Clearance: 95.2 mL/min (by C-G formula based on Cr of 1).    Intake & Output (last 3 days)         01/12 0701 - 01/13 0700 01/13 0701 - 01/14 0700 01/14 0701 - 01/15 0700 01/15 0701 - 01/16 0700      IV Piggyback   2448     Total Intake(mL/kg)   2448 (30)     Net     +2448              Unmeasured Urine Occurrence    1 x            Microbiology and Radiology  Microbiology Results (last 10 days)       Procedure Component Value - Date/Time      Influenza Antigen, Rapid - Swab, Nasopharynx [[id number]]  (Abnormal) Collected:  01/15/18 0229    Lab Status:  Final result Specimen:  Swab from Nasopharynx Updated:  " 01/15/18 0303     Influenza A Ag, EIA Positive (A)     Influenza B Ag, EIA Negative    Rapid Strep A Screen - Swab, Throat [[id number]]  (Normal) Collected:  01/15/18 0229    Lab Status:  Final result Specimen:  Swab from Throat Updated:  01/15/18 0255     Strep A Ag Negative    Narrative:         Test performed by [facility].            Assessment/Plan:   1. Patient received loading dose of 1750 mg IV x 1, on 1/15 at 0834  2. Given CrCl and goal trough of 10-15 mcg/mL, will start regimen of 1000 mg IV q12 at 1/16 at 0000  3. Trough level ordered 1/17 at 1130, 30 minutes prior to 4th dose  4. Pharmacy will continue to monitor renal function, cultures, and clinical status and adjust regimen as necessary.    Thank you,   Lisseth Houser, PharmD Candidate 2018

## 2018-01-15 NOTE — PLAN OF CARE
Problem: Patient Care Overview (Adult)  Goal: Plan of Care Review  Outcome: Ongoing (interventions implemented as appropriate)   01/15/18 5771   Coping/Psychosocial Response Interventions   Plan Of Care Reviewed With patient   Outcome Evaluation   Outcome Summary/Follow up Plan Clinical swallow eval and FEES complete. Severe pharyngeal dysphagia. Edema of the back and base of tongue and arytenoids. White coat on surface of tongue and arytenoids. Pt reported throat pain throughout study. Asp during the swallow w/ thins 2' decreased airway closure and epiglottic inversion. Pen during the swallow w/ pudding that did not clear. Pt with multiple swallows w/ each trial that did not significantly residue residue. Asp after the swallow 2' thin residue mixing with secretions and spilling in from the pyriforms. Pt at high riskl for asp after the swallow w/ pudding. REC: NPO, meds alt route, Per discussion w/ MD, if PO meds needed then give in liquid form. SLP will monitor for improvement of symptoms and re-eval readiness.

## 2018-01-16 LAB
ALBUMIN SERPL-MCNC: 4 G/DL (ref 3.2–4.8)
ALBUMIN/GLOB SERPL: 1.5 G/DL (ref 1.5–2.5)
ALP SERPL-CCNC: 94 U/L (ref 25–100)
ALT SERPL W P-5'-P-CCNC: 54 U/L (ref 7–40)
ANION GAP SERPL CALCULATED.3IONS-SCNC: 9 MMOL/L (ref 3–11)
AST SERPL-CCNC: 27 U/L (ref 0–33)
BASOPHILS # BLD AUTO: 0 10*3/MM3 (ref 0–0.2)
BASOPHILS NFR BLD AUTO: 0 % (ref 0–1)
BILIRUB SERPL-MCNC: 0.5 MG/DL (ref 0.3–1.2)
BUN BLD-MCNC: 14 MG/DL (ref 9–23)
BUN/CREAT SERPL: 20 (ref 7–25)
CALCIUM SPEC-SCNC: 8.5 MG/DL (ref 8.7–10.4)
CHLORIDE SERPL-SCNC: 104 MMOL/L (ref 99–109)
CMV IGG SERPL IA-ACNC: 1.4 U/ML (ref 0–0.59)
CMV IGM SERPL IA-ACNC: <30 AU/ML (ref 0–29.9)
CO2 SERPL-SCNC: 24 MMOL/L (ref 20–31)
CREAT BLD-MCNC: 0.7 MG/DL (ref 0.6–1.3)
DEPRECATED RDW RBC AUTO: 44.1 FL (ref 37–54)
EOSINOPHIL # BLD AUTO: 0 10*3/MM3 (ref 0–0.3)
EOSINOPHIL NFR BLD AUTO: 0 % (ref 0–3)
ERYTHROCYTE [DISTWIDTH] IN BLOOD BY AUTOMATED COUNT: 13.5 % (ref 11.3–14.5)
GFR SERPL CREATININE-BSD FRML MDRD: 117 ML/MIN/1.73
GLOBULIN UR ELPH-MCNC: 2.6 GM/DL
GLUCOSE BLD-MCNC: 137 MG/DL (ref 70–100)
HAV IGM SERPL QL IA: NORMAL
HBV CORE IGM SERPL QL IA: NORMAL
HBV SURFACE AG SERPL QL IA: NORMAL
HCT VFR BLD AUTO: 41.1 % (ref 38.9–50.9)
HCV AB SER DONR QL: NORMAL
HGB BLD-MCNC: 13.5 G/DL (ref 13.1–17.5)
IMM GRANULOCYTES # BLD: 0.04 10*3/MM3 (ref 0–0.03)
IMM GRANULOCYTES NFR BLD: 0.3 % (ref 0–0.6)
LYMPHOCYTES # BLD AUTO: 1.05 10*3/MM3 (ref 0.6–4.8)
LYMPHOCYTES NFR BLD AUTO: 7.7 % (ref 24–44)
MAGNESIUM SERPL-MCNC: 2.3 MG/DL (ref 1.3–2.7)
MCH RBC QN AUTO: 29.4 PG (ref 27–31)
MCHC RBC AUTO-ENTMCNC: 32.8 G/DL (ref 32–36)
MCV RBC AUTO: 89.5 FL (ref 80–99)
MONOCYTES # BLD AUTO: 1 10*3/MM3 (ref 0–1)
MONOCYTES NFR BLD AUTO: 7.3 % (ref 0–12)
NEUTROPHILS # BLD AUTO: 11.61 10*3/MM3 (ref 1.5–8.3)
NEUTROPHILS NFR BLD AUTO: 84.7 % (ref 41–71)
PLATELET # BLD AUTO: 213 10*3/MM3 (ref 150–450)
PMV BLD AUTO: 9.9 FL (ref 6–12)
POTASSIUM BLD-SCNC: 3.9 MMOL/L (ref 3.5–5.5)
PROT SERPL-MCNC: 6.6 G/DL (ref 5.7–8.2)
RBC # BLD AUTO: 4.59 10*6/MM3 (ref 4.2–5.76)
SODIUM BLD-SCNC: 137 MMOL/L (ref 132–146)
WBC NRBC COR # BLD: 13.7 10*3/MM3 (ref 3.5–10.8)

## 2018-01-16 PROCEDURE — 84481 FREE ASSAY (FT-3): CPT | Performed by: INTERNAL MEDICINE

## 2018-01-16 PROCEDURE — 99233 SBSQ HOSP IP/OBS HIGH 50: CPT | Performed by: INTERNAL MEDICINE

## 2018-01-16 PROCEDURE — 80053 COMPREHEN METABOLIC PANEL: CPT | Performed by: INTERNAL MEDICINE

## 2018-01-16 PROCEDURE — 92610 EVALUATE SWALLOWING FUNCTION: CPT

## 2018-01-16 PROCEDURE — 25010000002 CEFTRIAXONE PER 250 MG: Performed by: INTERNAL MEDICINE

## 2018-01-16 PROCEDURE — 94799 UNLISTED PULMONARY SVC/PX: CPT

## 2018-01-16 PROCEDURE — 80074 ACUTE HEPATITIS PANEL: CPT | Performed by: INTERNAL MEDICINE

## 2018-01-16 PROCEDURE — 25010000002 HYDROMORPHONE PER 4 MG: Performed by: INTERNAL MEDICINE

## 2018-01-16 PROCEDURE — 25010000002 FLUCONAZOLE PER 200 MG: Performed by: INTERNAL MEDICINE

## 2018-01-16 PROCEDURE — 85025 COMPLETE CBC W/AUTO DIFF WBC: CPT | Performed by: INTERNAL MEDICINE

## 2018-01-16 PROCEDURE — 83735 ASSAY OF MAGNESIUM: CPT | Performed by: INTERNAL MEDICINE

## 2018-01-16 RX ADMIN — PANTOPRAZOLE SODIUM 40 MG: 40 INJECTION, POWDER, FOR SOLUTION INTRAVENOUS at 09:23

## 2018-01-16 RX ADMIN — OSELTAMIVIR PHOSPHATE 75 MG: 6 POWDER, FOR SUSPENSION ORAL at 21:58

## 2018-01-16 RX ADMIN — HYDROMORPHONE HYDROCHLORIDE 0.5 MG: 2 INJECTION INTRAMUSCULAR; INTRAVENOUS; SUBCUTANEOUS at 04:12

## 2018-01-16 RX ADMIN — SODIUM CHLORIDE 125 ML/HR: 9 INJECTION, SOLUTION INTRAVENOUS at 09:24

## 2018-01-16 RX ADMIN — CEFTRIAXONE SODIUM 1 G: 1 INJECTION, SOLUTION INTRAVENOUS at 10:43

## 2018-01-16 RX ADMIN — FLUCONAZOLE 200 MG: 200 INJECTION, SOLUTION INTRAVENOUS at 09:23

## 2018-01-16 RX ADMIN — HYDROMORPHONE HYDROCHLORIDE 0.5 MG: 2 INJECTION INTRAMUSCULAR; INTRAVENOUS; SUBCUTANEOUS at 10:43

## 2018-01-16 RX ADMIN — SODIUM CHLORIDE 125 ML/HR: 9 INJECTION, SOLUTION INTRAVENOUS at 01:20

## 2018-01-16 RX ADMIN — OSELTAMIVIR PHOSPHATE 75 MG: 6 POWDER, FOR SUSPENSION ORAL at 10:43

## 2018-01-16 RX ADMIN — PANTOPRAZOLE SODIUM 40 MG: 40 INJECTION, POWDER, FOR SOLUTION INTRAVENOUS at 21:58

## 2018-01-16 NOTE — PROGRESS NOTES
[facility] Medicine Services  PROGRESS NOTE    Patient Name: Kayode Cline  : 1960  MRN: [id number]    Date of Admission: 1/15/2018  Length of Stay: 1  Primary Care Physician: Bong Veloz MD    Subjective   Subjective     CC:  Fever, influenza, pharyngitis    HPI:  Throat less sore today, slight cough    Review of Systems   HENT: Positive for sore throat and trouble swallowing.    Respiratory: Negative.    Cardiovascular: Negative.    Gastrointestinal: Negative.    Neurological: Negative.    Psychiatric/Behavioral: Negative.          Otherwise ROS is negative except as mentioned in the HPI.    Objective   Objective     Vital Signs:   Temp:  [97.7 °F (36.5 °C)-99.1 °F (37.3 °C)] 97.9 °F (36.6 °C)  Heart Rate:  [71-94] 94  Resp:  [18] 18  BP: (118-146)/(82-89) 118/84        Physical Exam:  Constitutional -no acute distress, non toxic, in bed  HEENT-NCAT, mucous membranes moist  CV-RRR, S1 S2 normal, no m/r/g  Resp-CTAB, no wheezes, rhonchi or rales  Abd-soft, non-tender, non-distended, normo active bowel sounds  Ext-No lower extremity cyanosis, clubbing or edema bilaterally  Neuro-alert and oriented x 3, speech clear, moves all extremities   Psych-normal affect   Skin- No rash on exposed UE or LE bilaterally    Results Reviewed:  I have personally reviewed current lab, radiology, and data and agree.      Results from last 7 days  Lab Units 01/15/18  0225   WBC 10*3/mm3 9.24   HEMOGLOBIN g/dL 14.6   HEMATOCRIT % 42.9   PLATELETS 10*3/mm3 201       Results from last 7 days  Lab Units 01/15/18  0225   SODIUM mmol/L 135   POTASSIUM mmol/L 3.5   CHLORIDE mmol/L 105   CO2 mmol/L 24.0   BUN mg/dL 12   CREATININE mg/dL 1.00   GLUCOSE mg/dL 127*   CALCIUM mg/dL 9.2   ALT (SGPT) U/L 85*   AST (SGOT) U/L 43*     No results found for: BNP  No results found for: PHART    Microbiology Results Abnormal     Procedure Component Value - Date/Time    Blood Culture - Blood, [[id number]]   (Normal) Collected:  01/15/18 0305    Lab Status:  Preliminary result Specimen:  Blood from Arm, Right Updated:  01/16/18 0331     Blood Culture No growth at 24 hours    Blood Culture - Blood, [[id number]]  (Normal) Collected:  01/15/18 0309    Lab Status:  Preliminary result Specimen:  Blood from Arm, Right Updated:  01/16/18 0331     Blood Culture No growth at 24 hours    Influenza Antigen, Rapid - Swab, Nasopharynx [[id number]]  (Abnormal) Collected:  01/15/18 0229    Lab Status:  Final result Specimen:  Swab from Nasopharynx Updated:  01/15/18 0303     Influenza A Ag, EIA Positive (A)     Influenza B Ag, EIA Negative    Rapid Strep A Screen - Swab, Throat [[id number]]  (Normal) Collected:  01/15/18 0229    Lab Status:  Final result Specimen:  Swab from Throat Updated:  01/15/18 0255     Strep A Ag Negative    Narrative:         Test performed by Direct Antigen Testing.          Imaging Results (last 24 hours)     Procedure Component Value Units Date/Time    XR Neck Soft Tissue [[id number]] Updated:  01/15/18 2094             I have reviewed the medications.    ceftriaxone 1 g Intravenous Q24H   fluconazole 200 mg Intravenous Daily   oseltamivir 75 mg Oral Q12H   pantoprazole 40 mg Intravenous Q12H         Assessment/Plan   Assessment / Plan     Hospital Problem List     * (Principal)Inability to swallow    Influenza A    Sepsis    Odynophagia    Delgado esophagus             Brief Hospital Course to date:  Kayode Cline is a 57 y.o. male with several days sore throat presents with fever and inability to swallow. Patient says pain is in the upper part of his throat. Some drooling noted in the ED. Fever 102 noted after arrival. Patient is influenza positive.      Assessment & Plan:    Influenza A  - tamiflu elixir    Pharyngitis  - on FEES examination, significant thrush on back of tongue and ayrtenoids. Epiglottis, however, does not appear red or edemetous.  - patient started on IV diflucan, consider changing to  nystatin PO once able to swallow    Dysphagia  - NPO currently, Speech to re-evaluate today -- assume as pharyngitis improves, so will swallowing function    Elevated LFTs  - etiology unclear, denies EtOH use; fatty liver?  - send hep panel  - will monitor, will need PCP for followup    Low TSH  - check Free T4    Renal insufficiency  - continue fluids, avoid nephrotoxins, CMP am    Hyperglycemia  - check a1c    DVT Prophylaxis:  Mechanical    CODE STATUS: Full Code    Disposition: I expect the patient to be discharged home in 1-2 days.    Darrick Yu MD  01/16/18  6:30 AM

## 2018-01-16 NOTE — PROGRESS NOTES
[facility] Progress Note    Admission Date: 1/15/2018    Kayode Cline  1960  [id number]    Date: 2018    Meds:    Anti-Infectives     Ordered     Dose/Rate Route Frequency Start Stop    01/15/18 1525  cefTRIAXone (ROCEPHIN) IVPB 1 g     Ordering Provider:  Darrick Yu MD    1 g  100 mL/hr over 30 Minutes Intravenous Every 24 Hours 18 1000 18 0959    01/15/18 1525  fluconazole (DIFLUCAN) IVPB 200 mg     Ordering Provider:  Darrick Yu MD    200 mg  over 60 Minutes Intravenous Daily 01/15/18 1600 18 0859    01/15/18 0828  oseltamivir (TAMIFLU) 6 MG/ML suspension 75 mg     Ordering Provider:  Darrick Yu MD    75 mg Oral Every 12 Hours Scheduled 01/15/18 0900 18 0859    01/15/18 0828  Pharmacy to dose vancomycin     Ordering Provider:  Darrick Yu MD     Does not apply Continuous PRN 01/15/18 0818 18 0817          CC:  Influenza      SUBJECTIVE: 1/15/18: Patient is a 57 y.o. male who presented to the ED with complaints of sore throat, cough, and difficulty swallowing.  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 His Influenza A rapid screen  was positive. Admitting labs without leukocytosis,  And he has been febrile with a maximum temperature of 102.4.  Chest xray was clear.  Vancomycin and Rocephin initiated followed by intravenous Zosyn and we were consulted for antibiotic management.  He has been having difficulty swallowing Tamiflu tablets and Tamiflu liquid.  18;  He is swallowing a little better.  Throat still sore.  Fever better.  Nervous about his business. Coughing quite a bit of bad tasting sputum.He denies any risk factors for HIV infection, including intravenous drug abuse, blood transfusions, and extramarital sexual contact.    ROS:  No f/c/s. No n/v/d. No rash. No new ADR to Abx.     PE:   Vital Signs  Temp (24hrs), Av °F (36.7 °C), Min:97.7  °F (36.5 °C), Max:98.2 °F (36.8 °C)    Temp  Min: 97.7 °F (36.5 °C)  Max: 98.2 °F (36.8 °C)  BP  Min: 118/84  Max: 173/69  Pulse  Min: 70  Max: 94  Resp  Min: 16  Max: 18  SpO2  Min: 90 %  Max: 99 %    GENERAL: Awake and alert, in no acute distress.   HEENT:  No conjunctival injection. No icterus. Oropharynx clear with evidence of thrush/ slight erythema   NECK: Supple, no JVD     HEART: RRR; No murmur, rubs, gallops.   LUNGS: Clear to auscultation bilaterally without wheezing, rales, rhonchi. Normal respiratory effort. Nonlabored. No dullness.  ABDOMEN: Soft, nontender, nondistended. Positive bowel sounds. No rebound or guarding. NO mass or HSM.  EXT:  No cyanosis, clubbing or edema. No cord.  : Genitalia generally unremarkable.  Without Malhotra catheter.  SKIN: Warm and dry without cutaneous eruptions on Inspection/palpation.    NEURO: Alert and oriented x 3, Motor 5/5 bilaterally    Laboratory Data      Results from last 7 days  Lab Units 01/16/18  0622 01/15/18  0225   WBC 10*3/mm3 13.70* 9.24   HEMOGLOBIN g/dL 13.5 14.6   HEMATOCRIT % 41.1 42.9   PLATELETS 10*3/mm3 213 201       Results from last 7 days  Lab Units 01/16/18  0622   SODIUM mmol/L 137   POTASSIUM mmol/L 3.9   CHLORIDE mmol/L 104   CO2 mmol/L 24.0   BUN mg/dL 14   CREATININE mg/dL 0.70   GLUCOSE mg/dL 137*   CALCIUM mg/dL 8.5*       Results from last 7 days  Lab Units 01/16/18  0622   ALK PHOS U/L 94   BILIRUBIN mg/dL 0.5   ALT (SGPT) U/L 54*   AST (SGOT) U/L 27               Results from last 7 days  Lab Units 01/15/18  0629   LACTATE mmol/L 0.7             Estimated Creatinine Clearance: 136 mL/min (by C-G formula based on Cr of 0.7).    Microbiology:  Blood Culture   Date Value Ref Range Status   01/15/2018 No growth at 24 hours  Preliminary         1/15:  Throat culture:  No Beta hemolytic Strep isolated                    Radiology:  Imaging Results (last 72 hours)     Procedure Component Value Units Date/Time    XR Chest 2 View [[id number]]  Collected:  01/15/18 0149     Updated:  01/15/18 0251    Narrative:       EXAM:    XR Chest, 2 Views    CLINICAL HISTORY:    57 years old, male; Signs and symptoms; Cough and fever; Symptoms not   specified; Additional info: Productive cough    TECHNIQUE:    Frontal and lateral views of the chest.    COMPARISON:    No relevant prior studies available.    FINDINGS:    Lungs: Linear right basilar opacity suggestive of subsegmental atelectasis or   scarring.  Otherwise clear.    Pleural space:  Unremarkable.  No pneumothorax.    Heart:  Unremarkable.    Mediastinum:  Unremarkable.    Bones/joints:  Unremarkable.      Impression:         No acute findings.    THIS DOCUMENT HAS BEEN ELECTRONICALLY SIGNED BY PRABHJOT ANNE MD    XR Neck Soft Tissue [[id number]] Collected:  01/16/18 0747     Updated:  01/16/18 0747    Narrative:       EXAMINATION: XR NECK SOFT TISSUE-      INDICATION: Possible epiglottitis.      COMPARISON: None.     FINDINGS:   1. Single lateral view of the cervical spine demonstrates normal  alignment and normal vertebral bodies. The disc space is well preserved.  2. The soft tissues of the neck are normal. There is no evidence of  airway embarrassment of the nasopharynx or oropharynx. Vallecula,  epiglottis and the area epiglottic folds are normal. Vestibule is  unremarkable. Subglottic trachea appears normal. Prevertebral soft  tissues are normal and there is no mass. The soft palate does not  embarrassed the upper airway.           Impression:       Negative lateral view cervical spine and negative soft  tissue view of the neck and airway.     D:  01/15/2018  E:  01/16/2018                 IMPRESSION:  1.  Influenza A-he has been having difficulty swallowing Tamiflu tablets and suspension.  His dysphagia now appears to be partially improved with steroid therapy.  I discussed his complex situation with clinical pharmacy today.  I investigated the potential use of intravenous Paramavir but it is not  currently available.  We will have his Tamiflu placed in thickened liquid and hopefully he will be able to swallow this preparation.  2.  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 ENT consulted, at risk of aspiration.  A throat culture is currently pending to evaluate for beta streptococcal infection.    At the time of his FEES study today, he was noted to have thrush.  We will place him on intravenous fluconazole.  3.  Fever- secondary to influenza  4.  Elevated liver enzymes-improving  5.  Thrush-he received a steroid injection approximately one month ago but was not on any other steroid therapy and has not received other antibiotics.  He does not have any risk factors for HIV infection but it was certainly be reasonable to check HIV serology in this situation.  I discussed this with him today and he does not want to have HIV serology performed for financial reasons.     RECOMMENDATIONS;  1.  Continue Ceftriaxone  2.  Continue Fluconazole  3.  Continue Tamiflu   4.  Possible discharge tomorrow    Dr. Fung has obtained the history, performed the physical exam and formulated the above treatment plan.     I discussed his complex situation with Dr. Yu again today.    Cisco Fung MD  1/16/2018  12:42 PM

## 2018-01-16 NOTE — THERAPY EVALUATION
"Acute Care - Speech Language Pathology   Swallow Re-Evaluation [facility]   Clinical Swallow Evaluation       Patient Name: Kayode Cline  : 1960  MRN: [id number]  Today's Date: 2018               Admit Date: 1/15/2018    Visit Dx:     ICD-10-CM ICD-9-CM   1. Influenza A J10.1 487.1   2. Odynophagia R13.10 787.20   3. Esophagitis K20.9 530.10     Patient Active Problem List   Diagnosis   • Influenza A   • Sepsis   • Odynophagia   • Delgado esophagus   • Inability to swallow     Past Medical History:   Diagnosis Date   • Delgado's esophagus    • Inability to swallow 1/15/2018     Past Surgical History:   Procedure Laterality Date   • ENDOSCOPY            SWALLOW EVALUATION (last 72 hours)      Swallow Evaluation       18 1330 01/15/18 0945             General Information    Patient Profile Review yes  -LS yes  -LS       Subjective Patient Observations alert and cooperative  -LS alert, cooeprative  -LS       Pertinent History Of Current Problem Admit w/ inability to swallow. Pt reports pain when swallowing and feeling food \"stuck in throat.\" He reports he feels that this has happened once before. Dx of influenza, thrush  -LS Admit w/ inability to swallow. Pt reports pain when swallowing and feeling food \"stuck in throat.\" He reports he feels that this has happened once before.  Dx of influenza  -LS       Current Diet Limitations NPO  -LS NPO  -LS       Prior Level of Function- Communication functional in all spheres  -LS functional in all spheres  -LS       Prior Level of Function- Swallowing no diet consistency restrictions  -LS no diet consistency restrictions  -LS       Plans/Goals Discussed With patient;agreed upon  -LS patient;agreed upon  -LS       Barriers to Rehab none identified  -LS none identified  -LS       Clinical Impression    Patient's Goals For Discharge return to PO diet  -LS patient did not state  -LS       Rehab Potential/Prognosis, Swallowing good, to achieve stated therapy " goals  -LS fair, will monitor progress closely  -LS       Criteria for Skilled Therapeutic Interventions Met skilled criteria for dysphagia intervention met  -LS skilled criteria for dysphagia intervention met  -LS       FCM, Swallowing 6-->Level 6  -LS 1-->Level 1  -LS       Therapy Frequency 5 times/wk  -LS 5 times/wk  -LS       SLP Diet Recommendation regular textures;thin liquids  - NPO: unsafe for food/liquid intake  -LS       SLP Rec. for Method of Medication Administration --   Thin liquids when able, Pills whole in thins or pureed  -LS meds via alternate route   Liquid form if PO meds needed.  -       Pain Assessment    Pain Assessment  --   odynophagia  -LS       Oral Motor Structure and Function    Oral Motor Anatomy and Physiology patient demonstrates anatomy and physiology that is WNL  -LS patient demonstrates anatomy and physiology that is WNL  -LS       Dentition Assessment  present and adequate  -LS       Volitional Swallow no difficulties initiating volitional swallow  -LS        Clinical Swallow Exam    Mode of Presentation self fed;cup;straw;spoon  -LS self fed;cup;spoon;straw  -LS       Oral Phase Results intact oral phase without signs of dysfunction  -LS intact oral phase without signs of dysfunction  -LS       Pharyngeal Phase Results  sign/symptoms of pharyngeal impairment  -LS       Summary of Clinical Exam Suspect mild pharyngeal dysphagia. Pt reports signficantly reduced pain and increased ease of spontaneous swallow. Pt presents w/ occasioanlly intermittant throat clear w/ thins liquids. No over s/s of asp w/ pureed and solids. Pt using single swallow w/ each sip/bite. SLP provided edu on risks of dyspahgia and re-instrumental swallow study. With MD agreement, Pt would like to start PO diet and declined instrumental swallow study at this time. REC: regular diet and thin liquids, meds in liquid form when able or whole in pureed. SLP to follow up x1 for diet tolerance.    -LS Suspect  severe pharyngeal dysphagia. Pt describe odynophagia. Multiple swallows with each trial of thins, nectar thick, and pudding. Pt reports globus sensation. Per discussion w/ MD will proceed w/ FEES. REC: NPO, meds via alt route or if PO meds needed, liquid form   -LS       Fiberoptic Endoscopic Swallowing Exam    Risks/Benefits Reviewed  risks/benefits explained;agreed to eval;patient;family  -LS       Positioning Needs for Swallow Exam  upright at 90 degrees  -LS       Anatomy and Physiology    Velopharyngeal  WFL  -LS       Base of Tongue  other (comment)   edema, thick white coating  -LS       Epiglottis  WFL  -LS       Laryngeal Function Breathing  other (comment)   difficult view  -LS       Laryngeal Function Phonation  CNA  -LS       Laryngeal Function Breath Hold  CNA  -LS       Secretions  1- Secretions present around the laryngeal vestibule  -LS       Secretion Description  thin;discolored  -LS       Spontaneous Swallow  frequency reduced;did not clear secretions  -LS       Sensory  senses scope  -LS       Oral-Phary Transit  WFL  -LS       Anatomy Hypopharynx    Observation Anatomic Considerations  anatomic deviation observed  -LS       Observations Comment  Back and base of tongue edematous w/ white coat. Edematous arytenoids with white coating. Epiglottis appeared to be WFL. Limited view of TVF.  -LS       FEES    Mode of Presentation  thin:;pudding:;spoon  -LS       Post Swallow Residue  thin:;pudding:;residue present pyriform sinuses;residue present in valleculae  -LS       Rosenbek's Scale  thin:;pudding:;8-->Level 8  -LS       Response to Aspiration  absent response, silent aspiration  -LS       Attempted Compensatory Maneuvers  bolus size;bolus presentation style  -LS       Pharyngeal Phase Results  impaired pharyngeal phase of swallowing  -LS       FEES Summary  Severe pharyngeal dysphagia. Edema of the back and base of tongue and arytenoids. White coat on surface of tongue and arytenoids. Pt  reported throat pain throughout study. Asp during the swallow w/ thins 2' decreased airway closure and epiglottic inversion. Pen during the swallow w/ pudding that did not clear. Pt with multiple swallows w/ each trial that did not significnatly resude residue. Asp after the swallow 2' thin residue mixing with secretions and spilling in from the pyriforms. Pt at high riskl for asp after the swallow w/ pudding. REC: NPO, meds alt route, Per discussion w/ MD, if PO meds needed then give in liquid form. SLP will monitor for imrpovement of symptoms and re-eval readiness.  -LS         User Key  (r) = Recorded By, (t) = Taken By, (c) = Cosigned By    Initials Name Effective Dates    LS Lin Cast MS [facility]-SLP 06/22/15 -         EDUCATION  The patient has been educated in the following areas:   Dysphagia (Swallowing Impairment) Oral Care/Hydration.    SLP Recommendation and Plan     SLP Diet Recommendation: regular textures, thin liquids     SLP Rec. for Method of Medication Administration:  (Thin liquids when able, Pills whole in thins or pureed)        Criteria for Skilled Therapeutic Interventions Met: skilled criteria for dysphagia intervention met     Rehab Potential/Prognosis, Swallowing: good, to achieve stated therapy goals  Therapy Frequency: 5 times/wk             Plan of Care Review  Plan Of Care Reviewed With: patient  Progress: progress toward functional goals as expected  Outcome Summary/Follow up Plan: Clinical swallow eval complete. Suspect mild pharyngeal dysphagia. Pt reports significantly reduced pain and increased ease of spontaneous swallow. Pt presents w/ occasionally intermittent throat clear w/ thins liquids. No over s/s of asp w/ pureed and solids. Pt using single swallow w/ each sip/bite. SLP provided edu on risks of dysphagia and re-instrumental swallow study. With MD agreement, Pt would like to start PO diet and declined instrumental swallow study at this time. REC: regular diet and thin  liquids, meds in liquid form when able or whole in pureed. SLP to follow up x1 for diet tolerance           Time Calculation:         Time Calculation- SLP       01/16/18 1517          Time Calculation- SLP    SLP Start Time 1330  -LS      SLP Received On 01/16/18  -        User Key  (r) = Recorded By, (t) = Taken By, (c) = Cosigned By    Initials Name Provider Type     Lin Cast, MS CCC-SLP Speech and Language Pathologist          Therapy Charges for Today     Code Description Service Date Service Provider Modifiers Qty    96151469519 HC ST EVAL ORAL PHARYNG SWALLOW 3 1/15/2018 Lin Cast, MS CCC-SLP GN 1    [id number] HC ST FIBEROPTIC ENDO EVAL SWALL 6 1/15/2018 Lin Cast, MS CCC-SLP GN 1    [id number] HC ST EVAL ORAL PHARYNG SWALLOW 3 1/16/2018 Lin Cast, MS CCC-SLP GN 1               Lin Cast, MS GIOVANNI-SLP  1/16/2018

## 2018-01-16 NOTE — PLAN OF CARE
Problem: Patient Care Overview (Adult)  Goal: Plan of Care Review  Outcome: Ongoing (interventions implemented as appropriate)   01/16/18 0053   Coping/Psychosocial Response Interventions   Plan Of Care Reviewed With patient   Patient Care Overview   Progress progress toward functional goals as expected   Outcome Evaluation   Outcome Summary/Follow up Plan Pt states throat pain has eased slightly.       Problem: Infection, Risk/Actual (Adult)  Goal: Identify Related Risk Factors and Signs and Symptoms  Outcome: Ongoing (interventions implemented as appropriate)   01/16/18 0053   Infection, Risk/Actual   Infection, Risk/Actual: Related Risk Factors age extremes   Signs and Symptoms (Infection, Risk/Actual) malaise

## 2018-01-16 NOTE — PLAN OF CARE
Problem: Dysphagia (Adult)  Goal: Functional/Safe Swallow  Outcome: Ongoing (interventions implemented as appropriate)   01/16/18 3937   Dysphagia (Adult)   Functional/Safe Swallow making progress toward outcome

## 2018-01-16 NOTE — PROGRESS NOTES
Discharge Planning Assessment  [facility]     Patient Name: Kayode Cline  MRN: [id number]  Today's Date: 1/16/2018    Admit Date: 1/15/2018          Discharge Needs Assessment       01/16/18 1450    Living Environment    Lives With spouse    Living Arrangements house    Home Accessibility no concerns    Type of Financial/Environmental Concern none    Transportation Available car;family or friend will provide    Living Environment    Quality Of Family Relationships supportive    Able to Return to Prior Living Arrangements yes    Discharge Needs Assessment    Concerns To Be Addressed no discharge needs identified    Anticipated Changes Related to Illness none    Equipment Currently Used at Home none    Equipment Needed After Discharge none            Discharge Plan       01/16/18 1451    Case Management/Social Work Plan    Plan Mr. Cline lives with his spouse in [place].  He is independent at home and his goal is to return home with his spouse.  Casemanagement will continue to follow for discharge planning.     Patient/Family In Agreement With Plan yes        Discharge Placement     No information found                Demographic Summary       01/16/18 1449    Primary Care Physician Information    Name Bong Veloz            Functional Status       01/16/18 1450    Functional Status Current    Ambulation 0-->independent    Transferring 0-->independent    Toileting 0-->independent    Bathing 0-->independent    Dressing 0-->independent    Eating 0-->independent    Communication 0-->understands/communicates without difficulty    Swallowing (if score 2 or more for any item, consult Rehab Services) 0-->swallows foods/liquids without difficulty    Change in Functional Status Since Onset of Current Illness/Injury yes    Functional Status Prior    Ambulation 0-->independent    Transferring 0-->independent    Toileting 0-->independent    Bathing 0-->independent    Dressing 0-->independent    Eating  0-->independent    Communication 0-->understands/communicates without difficulty    Swallowing 0-->swallows foods/liquids without difficulty    IADL    Medications independent    Meal Preparation independent    Housekeeping independent    Laundry independent    Shopping independent    Oral Care independent    Activity Tolerance    Current Activity Limitations none    Usual Activity Tolerance good    Current Activity Tolerance good    Cognitive/Perceptual/Developmental    Current Mental Status/Cognitive Functioning no deficits noted    Recent Changes in Mental Status/Cognitive Functioning no changes    Developmental Stage (Eriksson's Stages of Development) Stage 7 (35-65 years/Middle Adulthood) Generativity vs. Stagnation            Psychosocial     None            Abuse/Neglect     None            Legal     None            Substance Abuse     None            Patient Forms     None          KRISH Sevilla

## 2018-01-16 NOTE — PLAN OF CARE
Problem: Patient Care Overview (Adult)  Goal: Plan of Care Review  Outcome: Ongoing (interventions implemented as appropriate)   01/16/18 1516   Coping/Psychosocial Response Interventions   Plan Of Care Reviewed With patient   Patient Care Overview   Progress progress toward functional goals as expected   Outcome Evaluation   Outcome Summary/Follow up Plan Clinical swallow eval complete. Suspect mild pharyngeal dysphagia. Pt reports significantly reduced pain and increased ease of spontaneous swallow. Pt presents w/ occasionally intermittent throat clear w/ thins liquids. No over s/s of asp w/ pureed and solids. Pt using single swallow w/ each sip/bite. SLP provided edu on risks of dysphagia and re-instrumental swallow study. With MD agreement, Pt would like to start PO diet and declined instrumental swallow study at this time. REC: regular diet and thin liquids, meds in liquid form when able or whole in pureed. SLP to follow up x1 for diet tolerance.

## 2018-01-17 VITALS
BODY MASS INDEX: 26.66 KG/M2 | DIASTOLIC BLOOD PRESSURE: 85 MMHG | HEART RATE: 63 BPM | HEIGHT: 69 IN | OXYGEN SATURATION: 98 % | RESPIRATION RATE: 18 BRPM | SYSTOLIC BLOOD PRESSURE: 138 MMHG | TEMPERATURE: 97.2 F | WEIGHT: 180 LBS

## 2018-01-17 PROBLEM — R13.10 ODYNOPHAGIA: Status: RESOLVED | Noted: 2018-01-15 | Resolved: 2018-01-17

## 2018-01-17 PROBLEM — A41.9 SEPSIS: Status: RESOLVED | Noted: 2018-01-15 | Resolved: 2018-01-17

## 2018-01-17 PROBLEM — R13.0 INABILITY TO SWALLOW: Status: RESOLVED | Noted: 2018-01-15 | Resolved: 2018-01-17

## 2018-01-17 LAB
ALBUMIN SERPL-MCNC: 3.4 G/DL (ref 3.2–4.8)
ALBUMIN/GLOB SERPL: 1.5 G/DL (ref 1.5–2.5)
ALP SERPL-CCNC: 79 U/L (ref 25–100)
ALT SERPL W P-5'-P-CCNC: 46 U/L (ref 7–40)
ANION GAP SERPL CALCULATED.3IONS-SCNC: 4 MMOL/L (ref 3–11)
AST SERPL-CCNC: 30 U/L (ref 0–33)
BACTERIA SPEC AEROBE CULT: NORMAL
BACTERIA SPEC RESP CULT: NORMAL
BACTERIA SPEC RESP CULT: NORMAL
BILIRUB SERPL-MCNC: 0.5 MG/DL (ref 0.3–1.2)
BUN BLD-MCNC: 17 MG/DL (ref 9–23)
BUN/CREAT SERPL: 24.3 (ref 7–25)
CALCIUM SPEC-SCNC: 8.1 MG/DL (ref 8.7–10.4)
CHLORIDE SERPL-SCNC: 108 MMOL/L (ref 99–109)
CO2 SERPL-SCNC: 25 MMOL/L (ref 20–31)
CREAT BLD-MCNC: 0.7 MG/DL (ref 0.6–1.3)
GFR SERPL CREATININE-BSD FRML MDRD: 117 ML/MIN/1.73
GLOBULIN UR ELPH-MCNC: 2.3 GM/DL
GLUCOSE BLD-MCNC: 112 MG/DL (ref 70–100)
GRAM STN SPEC: NORMAL
POTASSIUM BLD-SCNC: 3.7 MMOL/L (ref 3.5–5.5)
PROT SERPL-MCNC: 5.7 G/DL (ref 5.7–8.2)
SODIUM BLD-SCNC: 137 MMOL/L (ref 132–146)
T3FREE SERPL-MCNC: 2 PG/ML (ref 2–4.4)
VANCOMYCIN TROUGH SERPL-MCNC: 1.5 MCG/ML (ref 10–20)

## 2018-01-17 PROCEDURE — 80202 ASSAY OF VANCOMYCIN: CPT

## 2018-01-17 PROCEDURE — 99239 HOSP IP/OBS DSCHRG MGMT >30: CPT | Performed by: NURSE PRACTITIONER

## 2018-01-17 PROCEDURE — 25010000002 CEFTRIAXONE PER 250 MG: Performed by: INTERNAL MEDICINE

## 2018-01-17 PROCEDURE — 25010000002 FLUCONAZOLE PER 200 MG: Performed by: INTERNAL MEDICINE

## 2018-01-17 PROCEDURE — 80053 COMPREHEN METABOLIC PANEL: CPT | Performed by: INTERNAL MEDICINE

## 2018-01-17 RX ORDER — FLUCONAZOLE 100 MG/1
100 TABLET ORAL DAILY
Qty: 7 TABLET
Start: 2018-01-17 | End: 2018-05-05

## 2018-01-17 RX ORDER — OSELTAMIVIR PHOSPHATE 6 MG/ML
75 FOR SUSPENSION ORAL EVERY 12 HOURS SCHEDULED
Qty: 75 ML | Refills: 0
Start: 2018-01-17 | End: 2018-01-20

## 2018-01-17 RX ADMIN — OSELTAMIVIR PHOSPHATE 75 MG: 6 POWDER, FOR SUSPENSION ORAL at 09:57

## 2018-01-17 RX ADMIN — CEFTRIAXONE SODIUM 1 G: 1 INJECTION, SOLUTION INTRAVENOUS at 09:58

## 2018-01-17 RX ADMIN — FLUCONAZOLE 200 MG: 200 INJECTION, SOLUTION INTRAVENOUS at 09:58

## 2018-01-17 RX ADMIN — PANTOPRAZOLE SODIUM 40 MG: 40 INJECTION, POWDER, FOR SOLUTION INTRAVENOUS at 09:58

## 2018-01-17 RX ADMIN — SODIUM CHLORIDE 125 ML/HR: 9 INJECTION, SOLUTION INTRAVENOUS at 05:53

## 2018-01-17 NOTE — SIGNIFICANT NOTE
01/17/18 1001   SLP Deferred Reason   SLP Deferred Reason Routine  (Pt reports swallow continues to improve. Pt has no further swallow concerns at this time. SLP will sign off.)

## 2018-01-17 NOTE — PROGRESS NOTES
[facility] Progress Note    Admission Date: 1/15/2018    Kayode Cline  1960  [id number]    Date: 1/17/2018    Meds:    Anti-Infectives     Ordered     Dose/Rate Route Frequency Start Stop    01/15/18 1525  cefTRIAXone (ROCEPHIN) IVPB 1 g     Ordering Provider:  Darrick Yu MD    1 g  100 mL/hr over 30 Minutes Intravenous Every 24 Hours 01/16/18 1000 01/23/18 0959    01/15/18 1525  fluconazole (DIFLUCAN) IVPB 200 mg     Ordering Provider:  Darrick Yu MD    200 mg  over 60 Minutes Intravenous Daily 01/15/18 1600 01/22/18 0859    01/15/18 0828  oseltamivir (TAMIFLU) 6 MG/ML suspension 75 mg     Ordering Provider:  Darrick Yu MD    75 mg Oral Every 12 Hours Scheduled 01/15/18 0900 01/20/18 0859    01/15/18 0828  Pharmacy to dose vancomycin     Ordering Provider:  Darrick Yu MD     Does not apply Continuous PRN 01/15/18 0818 01/22/18 0817          CC:  Influenza      SUBJECTIVE: 1/15/18: Patient is a 57 y.o. male who presented to the ED with complaints of sore throat, cough, and difficulty swallowing.  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 His Influenza A rapid screen  was positive. Admitting labs without leukocytosis,  And he has been febrile with a maximum temperature of 102.4.  Chest xray was clear.  Vancomycin and Rocephin initiated followed by intravenous Zosyn and we were consulted for antibiotic management.  He has been having difficulty swallowing Tamiflu tablets and Tamiflu liquid.  1/6/18;  He is swallowing a little better.  Throat still sore.  Fever better.  Nervous about his business. Coughing quite a bit of bad tasting sputum.He denies any risk factors for HIV infection, including intravenous drug abuse, blood transfusions, and extramarital sexual contact.  1/17/18:    He is now eating a regular diet.  Throat no longer sore.  He wants to go home, and has to return to work soon.  He remains afebrile.     ROS:  No f/c/s. No n/v/d. No rash. No new ADR to Abx.     PE:   Vital Signs  Temp (24hrs), Av.9 °F (36.6 °C), Min:97.8 °F (36.6 °C), Max:98.1 °F (36.7 °C)    Temp  Min: 97.8 °F (36.6 °C)  Max: 98.1 °F (36.7 °C)  BP  Min: 107/58  Max: 135/93  Pulse  Min: 56  Max: 87  Resp  Min: 16  Max: 16  SpO2  Min: 94 %  Max: 98 %    GENERAL: Awake and alert, in no acute distress.   HEENT:  No conjunctival injection. No icterus. Oropharynx clear with evidence of thrush/ slight erythema of posterior pharynx  NECK: Supple, no JVD     HEART: RRR; No murmur, rubs, gallops.   LUNGS: Clear to auscultation bilaterally without wheezing, rales, rhonchi. Normal respiratory effort. Nonlabored. No dullness.  ABDOMEN: Soft, nontender, nondistended. Positive bowel sounds. No rebound or guarding. NO mass or HSM.  EXT:  No cyanosis, clubbing or edema. No cord.  : Genitalia generally unremarkable.  Without Malhotra catheter.  SKIN: Warm and dry without cutaneous eruptions on Inspection/palpation.    NEURO: Alert and oriented x 3, Motor 5/5 bilaterally    Laboratory Data      Results from last 7 days  Lab Units 18  0622 01/15/18  0225   WBC 10*3/mm3 13.70* 9.24   HEMOGLOBIN g/dL 13.5 14.6   HEMATOCRIT % 41.1 42.9   PLATELETS 10*3/mm3 213 201       Results from last 7 days  Lab Units 18  0521   SODIUM mmol/L 137   POTASSIUM mmol/L 3.7   CHLORIDE mmol/L 108   CO2 mmol/L 25.0   BUN mg/dL 17   CREATININE mg/dL 0.70   GLUCOSE mg/dL 112*   CALCIUM mg/dL 8.1*       Results from last 7 days  Lab Units 18  0521   ALK PHOS U/L 79   BILIRUBIN mg/dL 0.5   ALT (SGPT) U/L 46*   AST (SGOT) U/L 30       1/15:  CMV IgG 1.4  , IgM <30         Results from last 7 days  Lab Units 01/15/18  0629   LACTATE mmol/L 0.7   hepatitis panel, negative           Estimated Creatinine Clearance: 136 mL/min (by C-G formula based on Cr of 0.7).    Microbiology:  Blood Culture   Date Value Ref Range Status   01/15/2018 No growth at 24  hours  Preliminary         1/15:  Throat culture:  No Beta hemolytic Strep isolated                    Radiology:  Imaging Results (last 72 hours)     Procedure Component Value Units Date/Time    XR Chest 2 View [[id number]] Collected:  01/15/18 0149     Updated:  01/15/18 0251    Narrative:       EXAM:    XR Chest, 2 Views    CLINICAL HISTORY:    57 years old, male; Signs and symptoms; Cough and fever; Symptoms not   specified; Additional info: Productive cough    TECHNIQUE:    Frontal and lateral views of the chest.    COMPARISON:    No relevant prior studies available.    FINDINGS:    Lungs: Linear right basilar opacity suggestive of subsegmental atelectasis or   scarring.  Otherwise clear.    Pleural space:  Unremarkable.  No pneumothorax.    Heart:  Unremarkable.    Mediastinum:  Unremarkable.    Bones/joints:  Unremarkable.      Impression:         No acute findings.    THIS DOCUMENT HAS BEEN ELECTRONICALLY SIGNED BY PRABHJOT ANNE MD    XR Neck Soft Tissue [[id number]] Collected:  01/16/18 0747     Updated:  01/16/18 0747    Narrative:       EXAMINATION: XR NECK SOFT TISSUE-      INDICATION: Possible epiglottitis.      COMPARISON: None.     FINDINGS:   1. Single lateral view of the cervical spine demonstrates normal  alignment and normal vertebral bodies. The disc space is well preserved.  2. The soft tissues of the neck are normal. There is no evidence of  airway embarrassment of the nasopharynx or oropharynx. Vallecula,  epiglottis and the area epiglottic folds are normal. Vestibule is  unremarkable. Subglottic trachea appears normal. Prevertebral soft  tissues are normal and there is no mass. The soft palate does not  embarrassed the upper airway.           Impression:       Negative lateral view cervical spine and negative soft  tissue view of the neck and airway.     D:  01/15/2018  E:  01/16/2018                 IMPRESSION:  1.  Influenza A-he has been having difficulty swallowing Tamiflu tablets and  suspension.  His dysphagia now appears to be partially improved with steroid therapy.  I discussed his complex situation with clinical pharmacy today.  I investigated the potential use of intravenous Paramavir but it is not currently available.  We will have his Tamiflu placed in thickened liquid and hopefully he will be able to swallow this preparation.  2.  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 This is improving with his FEEs yesterday.   3.  Fever- secondary to influenza, improved   4.  Elevated liver enzymes-improving  5.  Thrush-he received a steroid injection approximately one month ago but was not on any other steroid therapy and has not received other antibiotics.  He does not have any risk factors for HIV infection but it was certainly be reasonable to check HIV serology in this situation.  I discussed this with him and he does not want to have HIV serology performed for financial reasons.     RECOMMENDATIONS;  1.  Continue Ceftriaxone  2.  Continue Fluconazole  3.  Continue Tamiflu - script on chart   4.  Possible discharge today     Dr. Fung has obtained the history, performed the physical exam and formulated the above treatment plan.         Angle Hedrick, APRN  1/17/2018  8:24 AM

## 2018-01-17 NOTE — PLAN OF CARE
Problem: Infection, Risk/Actual (Adult)  Goal: Identify Related Risk Factors and Signs and Symptoms  Outcome: Ongoing (interventions implemented as appropriate)   01/17/18 1135   Infection, Risk/Actual   Signs and Symptoms (Infection, Risk/Actual) cultures positive

## 2018-01-17 NOTE — PROGRESS NOTES
[facility] Progress Note    Admission Date: 1/15/2018    Kayode Cline  1960  [id number]    Date: 1/17/2018    Meds:    Anti-Infectives     Ordered     Dose/Rate Route Frequency Start Stop    01/15/18 1525  cefTRIAXone (ROCEPHIN) IVPB 1 g     Ordering Provider:  Darrick Yu MD    1 g  100 mL/hr over 30 Minutes Intravenous Every 24 Hours 01/16/18 1000 01/23/18 0959    01/15/18 1525  fluconazole (DIFLUCAN) IVPB 200 mg     Ordering Provider:  Darrick Yu MD    200 mg  over 60 Minutes Intravenous Daily 01/15/18 1600 01/22/18 0859    01/15/18 0828  oseltamivir (TAMIFLU) 6 MG/ML suspension 75 mg     Ordering Provider:  Darrick Yu MD    75 mg Oral Every 12 Hours Scheduled 01/15/18 0900 01/20/18 0859    01/15/18 0828  Pharmacy to dose vancomycin     Ordering Provider:  Darrick Yu MD     Does not apply Continuous PRN 01/15/18 0818 01/22/18 0817          CC:  Influenza      SUBJECTIVE: 1/15/18: Patient is a 57 y.o. male who presented to the ED with complaints of sore throat, cough, and difficulty swallowing.  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 His Influenza A rapid screen  was positive. Admitting labs without leukocytosis,  And he has been febrile with a maximum temperature of 102.4.  Chest xray was clear.  Vancomycin and Rocephin initiated followed by intravenous Zosyn and we were consulted for antibiotic management.  He has been having difficulty swallowing Tamiflu tablets and Tamiflu liquid.  1/6/18;  He is swallowing a little better.  Throat still sore.  Fever better.  Nervous about his business. Coughing quite a bit of bad tasting sputum.He denies any risk factors for HIV infection, including intravenous drug abuse, blood transfusions, and extramarital sexual contact.  1/17/18:    He is now eating a regular diet.  Throat no longer sore.  He wants to go home, and has to return to work soon.  He remains afebrile.     ROS:  No f/c/s. No n/v/d. No rash. No new ADR to Abx.     PE:   Vital Signs  Temp (24hrs), Av.8 °F (36.6 °C), Min:97.2 °F (36.2 °C), Max:98.1 °F (36.7 °C)    Temp  Min: 97.2 °F (36.2 °C)  Max: 98.1 °F (36.7 °C)  BP  Min: 107/58  Max: 138/85  Pulse  Min: 56  Max: 87  Resp  Min: 16  Max: 18  SpO2  Min: 94 %  Max: 98 %    GENERAL: Awake and alert, in no acute distress.   HEENT:  No conjunctival injection. No icterus. Oropharynx clear with evidence of thrush/ slight erythema of posterior pharynx  NECK: Supple, no JVD     HEART: RRR; No murmur, rubs, gallops.   LUNGS: Clear to auscultation bilaterally without wheezing, rales, rhonchi. Normal respiratory effort. Nonlabored. No dullness.  ABDOMEN: Soft, nontender, nondistended. Positive bowel sounds. No rebound or guarding. NO mass or HSM.  EXT:  No cyanosis, clubbing or edema. No cord.  : Genitalia generally unremarkable.  Without Malhotra catheter.  SKIN: Warm and dry without cutaneous eruptions on Inspection/palpation.    NEURO: Alert and oriented x 3, Motor 5/5 bilaterally    Laboratory Data      Results from last 7 days  Lab Units 18  0622 01/15/18  0225   WBC 10*3/mm3 13.70* 9.24   HEMOGLOBIN g/dL 13.5 14.6   HEMATOCRIT % 41.1 42.9   PLATELETS 10*3/mm3 213 201       Results from last 7 days  Lab Units 18  0521   SODIUM mmol/L 137   POTASSIUM mmol/L 3.7   CHLORIDE mmol/L 108   CO2 mmol/L 25.0   BUN mg/dL 17   CREATININE mg/dL 0.70   GLUCOSE mg/dL 112*   CALCIUM mg/dL 8.1*       Results from last 7 days  Lab Units 18  0521   ALK PHOS U/L 79   BILIRUBIN mg/dL 0.5   ALT (SGPT) U/L 46*   AST (SGOT) U/L 30       1/15:  CMV IgG 1.4  , IgM <30         Results from last 7 days  Lab Units 01/15/18  0629   LACTATE mmol/L 0.7   hepatitis panel, negative           Estimated Creatinine Clearance: 136 mL/min (by C-G formula based on Cr of 0.7).    Microbiology:  Blood Culture   Date Value Ref Range Status   01/15/2018 No growth at 24  hours  Preliminary         1/15:  Throat culture:  No Beta hemolytic Strep isolated                    Radiology:  Imaging Results (last 72 hours)     Procedure Component Value Units Date/Time    XR Chest 2 View [[id number]] Collected:  01/15/18 0149     Updated:  01/15/18 0251    Narrative:       EXAM:    XR Chest, 2 Views    CLINICAL HISTORY:    57 years old, male; Signs and symptoms; Cough and fever; Symptoms not   specified; Additional info: Productive cough    TECHNIQUE:    Frontal and lateral views of the chest.    COMPARISON:    No relevant prior studies available.    FINDINGS:    Lungs: Linear right basilar opacity suggestive of subsegmental atelectasis or   scarring.  Otherwise clear.    Pleural space:  Unremarkable.  No pneumothorax.    Heart:  Unremarkable.    Mediastinum:  Unremarkable.    Bones/joints:  Unremarkable.      Impression:         No acute findings.    THIS DOCUMENT HAS BEEN ELECTRONICALLY SIGNED BY PRABHJOT ANNE MD    XR Neck Soft Tissue [[id number]] Collected:  01/16/18 0747     Updated:  01/16/18 0747    Narrative:       EXAMINATION: XR NECK SOFT TISSUE-      INDICATION: Possible epiglottitis.      COMPARISON: None.     FINDINGS:   1. Single lateral view of the cervical spine demonstrates normal  alignment and normal vertebral bodies. The disc space is well preserved.  2. The soft tissues of the neck are normal. There is no evidence of  airway embarrassment of the nasopharynx or oropharynx. Vallecula,  epiglottis and the area epiglottic folds are normal. Vestibule is  unremarkable. Subglottic trachea appears normal. Prevertebral soft  tissues are normal and there is no mass. The soft palate does not  embarrassed the upper airway.           Impression:       Negative lateral view cervical spine and negative soft  tissue view of the neck and airway.     D:  01/15/2018  E:  01/16/2018                 IMPRESSION:  1.  Influenza A-he has been having difficulty swallowing Tamiflu tablets and  suspension.  His dysphagia now appears to be partially improved with steroid therapy.  I discussed his complex situation with clinical pharmacy today.  I investigated the potential use of intravenous Paramavir but it is not currently available.  We will have his Tamiflu placed in thickened liquid and hopefully he will be able to swallow this preparation.  2.  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 This is improving with his FEEs yesterday.   3.  Fever- secondary to influenza, improved   4.  Elevated liver enzymes-improving  5.  Thrush-he received a steroid injection approximately one month ago but was not on any other steroid therapy and has not received other antibiotics.  He does not have any risk factors for HIV infection but it was certainly be reasonable to check HIV serology in this situation.  I discussed this with him and he does not want to have HIV serology performed for financial reasons.     RECOMMENDATIONS;  1.  Continue Ceftriaxone  2.  Continue Fluconazole- script on chart   3.  Continue Tamiflu - script on chart   4.  Possible discharge today     Dr. Fung has obtained the history, performed the physical exam and formulated the above treatment plan.         Angle Hedrick, APRN  1/17/2018  8:58 AM

## 2018-01-17 NOTE — PLAN OF CARE
Problem: Patient Care Overview (Adult)  Goal: Plan of Care Review  Outcome: Ongoing (interventions implemented as appropriate)   01/17/18 0501   Coping/Psychosocial Response Interventions   Plan Of Care Reviewed With patient   Patient Care Overview   Progress progress toward functional goals as expected   Outcome Evaluation   Outcome Summary/Follow up Plan PT no longer having swalling difficulties and requesting that medications be changed to PO pill form. SCD's used while sleeping. Ambulation enciouraged. IS instruction given and encouraged while awake.        Problem: Infection, Risk/Actual (Adult)  Goal: Identify Related Risk Factors and Signs and Symptoms  Outcome: Ongoing (interventions implemented as appropriate)   01/17/18 0501   Infection, Risk/Actual   Infection, Risk/Actual: Related Risk Factors sleep disturbance   Signs and Symptoms (Infection, Risk/Actual) malaise

## 2018-01-17 NOTE — DISCHARGE SUMMARY
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 male with PMH of Delgado esophagus who presents with with fever and inability to swallow. Patient says pain is in the upper part of his throat. Some drooling noted in the ED. Fever 102 noted after arrival. Patient is influenza positive. Placed on tamiflu and diflucan for thrush. Strep negative. Patient underwent FEES exam and passed once being treated with diflucan.     Fever and dysphagia has resolved. Sore throat significantly improved. Patient with noted renal insufficiency that resolved with IVFs. Patient to resume home medication and be discharge with 3 additional days tamiflu and 7 days of diflucan. Follow up with pcp in 1 week.            Day of Discharge     HPI:   Patient resting in bed. No new events overnight and no complaints. Patient states able to swallow without pain and tolerated breakfast today.     Review of Systems  Gen- No fevers, chills  CV- No chest pain, palpitations  Resp- No cough, dyspnea  GI- No N/V/D, abd pain      Otherwise ROS is negative except as mentioned in the  HPI.    Vital Signs:   Temp:  [97.2 °F (36.2 °C)-98.1 °F (36.7 °C)] 97.2 °F (36.2 °C)  Heart Rate:  [56-87] 63  Resp:  [16-18] 18  BP: (107-138)/(58-93) 138/85     Physical Exam:  Constitutional: No acute distress, awake, alert  HENT: NCAT, mucous membranes moist  Respiratory: Clear to auscultation bilaterally, respiratory effort normal   Cardiovascular: RRR, no murmurs, rubs, or gallops, palpable pedal pulses bilaterally  Gastrointestinal: Positive bowel sounds, soft, nontender, nondistended  Musculoskeletal: No bilateral ankle edema  Psychiatric: Appropriate affect, cooperative  Neurologic: Oriented x 3, strength symmetric in all extremities, Cranial Nerves grossly intact to confrontation, speech clear  Skin: No rashes      Pertinent  and/or Most Recent Results       Results from last 7 days  Lab Units 01/17/18  0521 01/16/18  0622 01/15/18  0225   WBC 10*3/mm3  --  13.70* 9.24   HEMOGLOBIN g/dL  --  13.5 14.6   HEMATOCRIT %  --  41.1 42.9   PLATELETS 10*3/mm3  --  213 201   SODIUM mmol/L 137 137 135   POTASSIUM mmol/L 3.7 3.9 3.5   CHLORIDE mmol/L 108 104 105   CO2 mmol/L 25.0 24.0 24.0   BUN mg/dL 17 14 12   CREATININE mg/dL 0.70 0.70 1.00   GLUCOSE mg/dL 112* 137* 127*   CALCIUM mg/dL 8.1* 8.5* 9.2       Results from last 7 days  Lab Units 01/17/18  0521 01/16/18  0622 01/15/18  0225   BILIRUBIN mg/dL 0.5 0.5 0.8   ALK PHOS U/L 79 94 132*   ALT (SGPT) U/L 46* 54* 85*   AST (SGOT) U/L 30 27 43*           Invalid input(s): TG, LDLREALC    Results from last 7 days  Lab Units 01/15/18  0225   TSH mIU/mL 0.154*   HEMOGLOBIN A1C % 6.20*     Brief Urine Lab Results  (Last result in the past 365 days)      Color   Clarity   Blood   Leuk Est   Nitrite   Protein   CREAT   Urine HCG        01/15/18 0317 Yellow Clear Negative Negative Negative Negative               Microbiology Results Abnormal     Procedure Component Value - Date/Time    Beta Strep Culture, Throat - Swab, Throat [[id number]]  (Normal) Collected:  01/15/18  0229    Lab Status:  Final result Specimen:  Swab from Throat Updated:  01/17/18 0737     Throat Culture, Beta Strep No Beta Hemolytic Streptococcus Isolated    Respiratory Culture - Sputum, Cough [[id number]] Collected:  01/15/18 1418    Lab Status:  Final result Specimen:  Sputum from Cough Updated:  01/17/18 0729     Respiratory Culture --      Heavy growth (4+) Normal Respiratory Laurita     Gram Stain Result Many (4+) WBCs per low power field      Few (2+) Epithelial cells per low power field      Many (4+) Gram positive cocci in pairs and chains      Few (2+) Gram variable bacilli    Blood Culture - Blood, [[id number]]  (Normal) Collected:  01/15/18 0305    Lab Status:  Preliminary result Specimen:  Blood from Arm, Right Updated:  01/17/18 0331     Blood Culture No growth at 2 days    Blood Culture - Blood, [[id number]]  (Normal) Collected:  01/15/18 0309    Lab Status:  Preliminary result Specimen:  Blood from Arm, Right Updated:  01/17/18 0331     Blood Culture No growth at 2 days    Influenza Antigen, Rapid - Swab, Nasopharynx [[id number]]  (Abnormal) Collected:  01/15/18 0229    Lab Status:  Final result Specimen:  Swab from Nasopharynx Updated:  01/15/18 0303     Influenza A Ag, EIA Positive (A)     Influenza B Ag, EIA Negative    Rapid Strep A Screen - Swab, Throat [[id number]]  (Normal) Collected:  01/15/18 0229    Lab Status:  Final result Specimen:  Swab from Throat Updated:  01/15/18 0255     Strep A Ag Negative    Narrative:         Test performed by Direct Antigen Testing.          Imaging Results (all)     Procedure Component Value Units Date/Time    XR Chest 2 View [[id number]] Collected:  01/15/18 0149     Updated:  01/15/18 0251    Narrative:       EXAM:    XR Chest, 2 Views    CLINICAL HISTORY:    57 years old, male; Signs and symptoms; Cough and fever; Symptoms not   specified; Additional info: Productive cough    TECHNIQUE:    Frontal and lateral views of the chest.    COMPARISON:    No relevant prior  studies available.    FINDINGS:    Lungs: Linear right basilar opacity suggestive of subsegmental atelectasis or   scarring.  Otherwise clear.    Pleural space:  Unremarkable.  No pneumothorax.    Heart:  Unremarkable.    Mediastinum:  Unremarkable.    Bones/joints:  Unremarkable.      Impression:         No acute findings.    THIS DOCUMENT HAS BEEN ELECTRONICALLY SIGNED BY PRABHJOT ANNE MD    XR Neck Soft Tissue [[id number]] Collected:  01/16/18 0747     Updated:  01/16/18 1106    Narrative:       EXAMINATION: XR NECK SOFT TISSUE-      INDICATION: Possible epiglottitis.      COMPARISON: None.     FINDINGS:   1. Single lateral view of the cervical spine demonstrates normal  alignment and normal vertebral bodies. The disc space is well preserved.  2. The soft tissues of the neck are normal. There is no evidence of  airway embarrassment of the nasopharynx or oropharynx. Vallecula,  epiglottis and the area epiglottic folds are normal. Vestibule is  unremarkable. Subglottic trachea appears normal. Prevertebral soft  tissues are normal and there is no mass. The soft palate does not  embarrassed the upper airway.           Impression:       Negative lateral view cervical spine and negative soft  tissue view of the neck and airway.     D:  01/15/2018  E:  01/16/2018     This report was finalized on 1/16/2018 11:04 AM by Dr. Jorje Hylton MD.                   Order Current Status    Blood Culture - Blood, Preliminary result    Blood Culture - Blood, Preliminary result        Discharge Details      Kayode Cline   Home Medication Instructions SHALA:[id number]    Printed on:01/17/18 0908   Medication Information                      fluconazole (DIFLUCAN) 100 MG tablet  Take 1 tablet by mouth Daily.             omeprazole (priLOSEC) 40 MG capsule  Take 40 mg by mouth Daily.             oseltamivir (TAMIFLU) 6 MG/ML suspension  Take 12.5 mL by mouth Every 12 (Twelve) Hours for 6 doses. Indications: Flu                    Discharge Disposition:  Home or Self Care    Discharge Diet:  Diet Instructions     Advance Diet As Tolerated                     Discharge Activity:   Activity Instructions     Activity as Tolerated                     Special Instructions:      No future appointments.    Additional Instructions for the Follow-ups that You Need to Schedule     Discharge Follow-up with PCP    As directed    Follow Up Details:  1 week                     Time Spent on Discharge:  35 minutes    Elisa Peres, APRN  01/17/18  9:26 AM

## 2018-01-20 LAB
BACTERIA SPEC AEROBE CULT: NORMAL
BACTERIA SPEC AEROBE CULT: NORMAL

## 2018-12-10 DIAGNOSIS — Z12.11 SCREENING FOR COLON CANCER: Primary | ICD-10-CM

## 2018-12-17 ENCOUNTER — OUTSIDE FACILITY SERVICE (OUTPATIENT)
Dept: GASTROENTEROLOGY | Facility: CLINIC | Age: 58
End: 2018-12-17

## 2018-12-17 PROCEDURE — G0105 COLORECTAL SCRN; HI RISK IND: HCPCS | Performed by: INTERNAL MEDICINE

## 2019-05-08 PROBLEM — Z00.00 ANNUAL PHYSICAL EXAM: Status: ACTIVE | Noted: 2019-05-08

## 2019-05-08 PROBLEM — B37.81 ESOPHAGEAL CANDIDIASIS (HCC): Status: ACTIVE | Noted: 2019-05-08

## 2019-05-08 PROBLEM — I10 BENIGN ESSENTIAL HYPERTENSION: Status: ACTIVE | Noted: 2019-05-08

## 2019-05-08 PROBLEM — N48.6 INDURATIO PENIS PLASTICA: Status: ACTIVE | Noted: 2019-05-08

## 2019-05-08 PROBLEM — K21.9 GERD (GASTROESOPHAGEAL REFLUX DISEASE): Status: ACTIVE | Noted: 2019-05-08

## 2019-05-08 PROBLEM — R73.03 PREDIABETES: Status: ACTIVE | Noted: 2019-05-08

## 2019-05-08 PROBLEM — J30.1 NON-SEASONAL ALLERGIC RHINITIS DUE TO POLLEN: Status: ACTIVE | Noted: 2019-05-08

## 2019-05-08 PROBLEM — E78.00 HYPERCHOLESTEROLEMIA: Status: ACTIVE | Noted: 2019-05-08

## 2019-05-13 RX ORDER — OMEPRAZOLE 40 MG/1
CAPSULE, DELAYED RELEASE ORAL
Qty: 30 CAPSULE | Refills: 0 | Status: SHIPPED | OUTPATIENT
Start: 2019-05-13 | End: 2019-05-29 | Stop reason: SDUPTHER

## 2019-05-29 ENCOUNTER — OFFICE VISIT (OUTPATIENT)
Dept: INTERNAL MEDICINE | Facility: CLINIC | Age: 59
End: 2019-05-29

## 2019-05-29 ENCOUNTER — LAB (OUTPATIENT)
Dept: LAB | Facility: HOSPITAL | Age: 59
End: 2019-05-29

## 2019-05-29 VITALS
SYSTOLIC BLOOD PRESSURE: 124 MMHG | HEIGHT: 69 IN | HEART RATE: 68 BPM | DIASTOLIC BLOOD PRESSURE: 78 MMHG | BODY MASS INDEX: 25.77 KG/M2 | WEIGHT: 174 LBS

## 2019-05-29 DIAGNOSIS — Z00.00 ANNUAL PHYSICAL EXAM: Primary | ICD-10-CM

## 2019-05-29 DIAGNOSIS — E78.00 HYPERCHOLESTEROLEMIA: ICD-10-CM

## 2019-05-29 DIAGNOSIS — Z11.59 ENCOUNTER FOR HEPATITIS C SCREENING TEST FOR LOW RISK PATIENT: ICD-10-CM

## 2019-05-29 DIAGNOSIS — R73.03 PREDIABETES: ICD-10-CM

## 2019-05-29 DIAGNOSIS — K22.70 BARRETT'S ESOPHAGUS WITHOUT DYSPLASIA: ICD-10-CM

## 2019-05-29 DIAGNOSIS — K21.9 GASTROESOPHAGEAL REFLUX DISEASE WITHOUT ESOPHAGITIS: ICD-10-CM

## 2019-05-29 DIAGNOSIS — Z12.5 PROSTATE CANCER SCREENING: ICD-10-CM

## 2019-05-29 DIAGNOSIS — I10 BENIGN ESSENTIAL HYPERTENSION: ICD-10-CM

## 2019-05-29 PROBLEM — J10.1 INFLUENZA A: Status: RESOLVED | Noted: 2018-01-15 | Resolved: 2019-05-29

## 2019-05-29 PROBLEM — B37.81 ESOPHAGEAL CANDIDIASIS (HCC): Status: RESOLVED | Noted: 2019-05-08 | Resolved: 2019-05-29

## 2019-05-29 LAB
ALBUMIN SERPL-MCNC: 4.6 G/DL (ref 3.5–5.2)
ALBUMIN UR-MCNC: <1.2 MG/L
ALBUMIN/GLOB SERPL: 2 G/DL
ALP SERPL-CCNC: 80 U/L (ref 39–117)
ALT SERPL W P-5'-P-CCNC: 37 U/L (ref 1–41)
ANION GAP SERPL CALCULATED.3IONS-SCNC: 11.4 MMOL/L
AST SERPL-CCNC: 35 U/L (ref 1–40)
BASOPHILS # BLD AUTO: 0.02 10*3/MM3 (ref 0–0.2)
BASOPHILS NFR BLD AUTO: 0.5 % (ref 0–1.5)
BILIRUB SERPL-MCNC: 0.4 MG/DL (ref 0.2–1.2)
BUN BLD-MCNC: 16 MG/DL (ref 6–20)
BUN/CREAT SERPL: 19 (ref 7–25)
CALCIUM SPEC-SCNC: 8.8 MG/DL (ref 8.6–10.5)
CHLORIDE SERPL-SCNC: 107 MMOL/L (ref 98–107)
CHOLEST SERPL-MCNC: 152 MG/DL (ref 0–200)
CO2 SERPL-SCNC: 25.6 MMOL/L (ref 22–29)
CREAT BLD-MCNC: 0.84 MG/DL (ref 0.76–1.27)
CREAT UR-MCNC: 189.8 MG/DL
DEPRECATED RDW RBC AUTO: 43.6 FL (ref 37–54)
EOSINOPHIL # BLD AUTO: 0.18 10*3/MM3 (ref 0–0.4)
EOSINOPHIL NFR BLD AUTO: 4.4 % (ref 0.3–6.2)
ERYTHROCYTE [DISTWIDTH] IN BLOOD BY AUTOMATED COUNT: 12.8 % (ref 12.3–15.4)
GFR SERPL CREATININE-BSD FRML MDRD: 94 ML/MIN/1.73
GLOBULIN UR ELPH-MCNC: 2.3 GM/DL
GLUCOSE BLD-MCNC: 106 MG/DL (ref 65–99)
HBA1C MFR BLD: 5.4 % (ref 4.8–5.6)
HCT VFR BLD AUTO: 42.7 % (ref 37.5–51)
HCV AB SER DONR QL: NORMAL
HDLC SERPL-MCNC: 47 MG/DL (ref 40–60)
HGB BLD-MCNC: 13.3 G/DL (ref 13–17.7)
IMM GRANULOCYTES # BLD AUTO: 0.01 10*3/MM3 (ref 0–0.05)
IMM GRANULOCYTES NFR BLD AUTO: 0.2 % (ref 0–0.5)
LDLC SERPL CALC-MCNC: 92 MG/DL (ref 0–100)
LDLC/HDLC SERPL: 1.95 {RATIO}
LYMPHOCYTES # BLD AUTO: 1.32 10*3/MM3 (ref 0.7–3.1)
LYMPHOCYTES NFR BLD AUTO: 32.5 % (ref 19.6–45.3)
MCH RBC QN AUTO: 29 PG (ref 26.6–33)
MCHC RBC AUTO-ENTMCNC: 31.1 G/DL (ref 31.5–35.7)
MCV RBC AUTO: 93.2 FL (ref 79–97)
MICROALBUMIN/CREAT UR: NORMAL MG/G
MONOCYTES # BLD AUTO: 0.38 10*3/MM3 (ref 0.1–0.9)
MONOCYTES NFR BLD AUTO: 9.4 % (ref 5–12)
NEUTROPHILS # BLD AUTO: 2.15 10*3/MM3 (ref 1.7–7)
NEUTROPHILS NFR BLD AUTO: 53 % (ref 42.7–76)
NRBC BLD AUTO-RTO: 0 /100 WBC (ref 0–0.2)
PLATELET # BLD AUTO: 262 10*3/MM3 (ref 140–450)
PMV BLD AUTO: 11.2 FL (ref 6–12)
POTASSIUM BLD-SCNC: 4.6 MMOL/L (ref 3.5–5.2)
PROT SERPL-MCNC: 6.9 G/DL (ref 6–8.5)
PSA SERPL-MCNC: 2.97 NG/ML (ref 0–4)
RBC # BLD AUTO: 4.58 10*6/MM3 (ref 4.14–5.8)
SODIUM BLD-SCNC: 144 MMOL/L (ref 136–145)
TRIGL SERPL-MCNC: 66 MG/DL (ref 0–150)
VLDLC SERPL-MCNC: 13.2 MG/DL (ref 5–40)
WBC NRBC COR # BLD: 4.06 10*3/MM3 (ref 3.4–10.8)

## 2019-05-29 PROCEDURE — 85025 COMPLETE CBC W/AUTO DIFF WBC: CPT

## 2019-05-29 PROCEDURE — 82043 UR ALBUMIN QUANTITATIVE: CPT

## 2019-05-29 PROCEDURE — 80061 LIPID PANEL: CPT

## 2019-05-29 PROCEDURE — 86803 HEPATITIS C AB TEST: CPT

## 2019-05-29 PROCEDURE — 82570 ASSAY OF URINE CREATININE: CPT

## 2019-05-29 PROCEDURE — 99396 PREV VISIT EST AGE 40-64: CPT | Performed by: INTERNAL MEDICINE

## 2019-05-29 PROCEDURE — 36415 COLL VENOUS BLD VENIPUNCTURE: CPT

## 2019-05-29 PROCEDURE — G0103 PSA SCREENING: HCPCS

## 2019-05-29 PROCEDURE — 83036 HEMOGLOBIN GLYCOSYLATED A1C: CPT

## 2019-05-29 PROCEDURE — 80053 COMPREHEN METABOLIC PANEL: CPT

## 2019-05-29 RX ORDER — CYCLOBENZAPRINE HCL 10 MG
1 TABLET ORAL DAILY
COMMUNITY
Start: 2018-05-07 | End: 2019-05-29 | Stop reason: SDUPTHER

## 2019-05-29 RX ORDER — CYCLOBENZAPRINE HCL 10 MG
10 TABLET ORAL 2 TIMES DAILY PRN
Qty: 60 TABLET | Refills: 2 | Status: SHIPPED | OUTPATIENT
Start: 2019-05-29 | End: 2019-08-09

## 2019-05-29 RX ORDER — OMEPRAZOLE 40 MG/1
40 CAPSULE, DELAYED RELEASE ORAL DAILY
Qty: 90 CAPSULE | Refills: 3 | Status: SHIPPED | OUTPATIENT
Start: 2019-05-29 | End: 2019-06-14 | Stop reason: SDUPTHER

## 2019-05-29 NOTE — PROGRESS NOTES
[facility]     Kayode Cline  1960   [id number]      Patient Care Team:  Bong Veloz MD as PCP - General    Chief Complaint::   Chief Complaint   Patient presents with   • Annual Exam        HPI  Mr. Cline is now 58.  He comes in for follow-up of his hypertension, GERD with Delgado's, hyperlipidemia, prediabetes and for annual examination.  Overall he is doing well. He admits he gained weight, last month was up to 186 pounds, has worked hard to lose weight since. He has recently installed a brick patio himself, which was very physically taxing.  He has no symptoms of reflux or heartburn.    Chronic Conditions:      Patient Active Problem List   Diagnosis   • Delgado esophagus   • GERD (gastroesophageal reflux disease)   • Benign essential hypertension   • Annual physical exam   • Prediabetes   • Non-seasonal allergic rhinitis due to pollen   • Induratio penis plastica   • Hypercholesterolemia        Past Medical History:   Diagnosis Date   • Delgado's esophagus 2008   • Esophageal candidiasis (CMS/[facility]) 5/8/2019   • Inability to swallow 1/15/2018   • Influenza A 1/15/2018       Past Surgical History:   Procedure Laterality Date   • ENDOSCOPY     • VASECTOMY  2002       Family History   Problem Relation Age of Onset   • Diabetes Mother    • Coronary artery disease Mother        Social History     Socioeconomic History   • Marital status:      Spouse name: Not on file   • Number of children: Not on file   • Years of education: Not on file   • Highest education level: Not on file   Tobacco Use   • Smoking status: Never Smoker   • Smokeless tobacco: Never Used   Substance and Sexual Activity   • Alcohol use: No   • Drug use: No   • Sexual activity: Defer     Partners: Female       No Known Allergies      Current Outpatient Medications:   •  Cetirizine HCl (ZYRTEC ALLERGY) 10 MG capsule, Take 1 tablet by mouth Daily As Needed., Disp: , Rfl:   •  cyclobenzaprine (FLEXERIL) 10 MG  "tablet, Take 1 tablet by mouth 2 (Two) Times a Day As Needed for Muscle Spasms., Disp: 60 tablet, Rfl: 2  •  omeprazole (priLOSEC) 40 MG capsule, Take 1 capsule by mouth Daily., Disp: 90 capsule, Rfl: 3    Immunization History   Administered Date(s) Administered   • Flu Vaccine Quad PF >18YRS 02/29/2016, 09/16/2016, 10/26/2017   • Flu Vaccine Quad PF >36MO 08/25/2018   • Hepatitis A 08/25/2018, 03/25/2019        Health Maintenance Due   Topic Date Due   • TDAP/TD VACCINES (1 - Tdap) 07/02/1980   • ZOSTER VACCINE (1 of 2) 07/02/2011   • ANNUAL PHYSICAL  05/23/2019        Review of Systems   Constitutional: Negative for chills, fatigue and fever.   HENT: Negative for congestion, ear pain and sinus pressure.    Respiratory: Negative for cough, chest tightness, shortness of breath and wheezing.    Cardiovascular: Negative for chest pain and palpitations.   Gastrointestinal: Negative for abdominal pain, blood in stool and constipation.   Skin: Negative for color change.   Allergic/Immunologic: Negative for environmental allergies.   Neurological: Negative for dizziness, speech difficulty and headache.   Psychiatric/Behavioral: Negative for decreased concentration. The patient is not nervous/anxious.         Vital Signs  Vitals:    05/29/19 0859   BP: 124/78   BP Location: Left arm   Patient Position: Sitting   Cuff Size: Adult   Pulse: 68   Weight: 78.9 kg (174 lb)   Height: 175.3 cm (69.02\")       Physical Exam   Constitutional: He is oriented to person, place, and time. He appears well-developed and well-nourished.   HENT:   Head: Normocephalic and atraumatic.   Right Ear: External ear normal.   Left Ear: External ear normal.   Nose: Nose normal.   Mouth/Throat: Oropharynx is clear and moist. No oropharyngeal exudate.   Eyes: Conjunctivae and EOM are normal. Pupils are equal, round, and reactive to light.   Neck: Normal range of motion. Neck supple. No JVD present. No thyromegaly present.   Cardiovascular: Normal rate, " regular rhythm, normal heart sounds and intact distal pulses. Exam reveals no gallop and no friction rub.   No murmur heard.  Pulmonary/Chest: Effort normal and breath sounds normal. No respiratory distress. He has no wheezes. He has no rales. He exhibits no tenderness.   Abdominal: Soft. Bowel sounds are normal. He exhibits no distension and no mass. There is no tenderness. There is no rebound and no guarding. No hernia.   Musculoskeletal: Normal range of motion. He exhibits no tenderness.   Lymphadenopathy:     He has no cervical adenopathy.   Neurological: He is alert and oriented to person, place, and time. He displays normal reflexes. No cranial nerve deficit or sensory deficit. He exhibits normal muscle tone. Coordination normal.   Skin: Skin is warm and dry. No rash noted. No erythema.   Psychiatric: He has a normal mood and affect. His behavior is normal. Judgment and thought content normal.   Nursing note and vitals reviewed.     Procedures    ACE III MINI             Assessment/Plan:    Kayode was seen today for annual exam.    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  -     cyclobenzaprine (FLEXERIL) 10 MG tablet; Take 1 tablet by mouth 2 (Two) Times a Day As Needed for Muscle Spasms.    Plan    He remains in very good health, currently with good lifestyle habits, but  that has been only recently.  His weight is now down to an acceptable level, I have encouraged him to continue working on healthy diet and exercise to keep it that way.  Colonoscopy was performed 12/18/2018, and is due at 5 years due to family history of polyps.    Blood pressure is well controlled    A1c and lipid panel are pending, the treatment remains continued healthy diet, exercise and weight control.    His last surveillance EGD was in 2016, and according to Dr. Retana's note, he is due this year.  His recollection is that he was given a longer period of time, so he will check his records.  However if he cannot find anything to document that we will refer for EGD this year.        Labs  Results for orders placed or performed during the hospital encounter of 01/15/18   Influenza Antigen, Rapid - Swab, Nasopharynx   Result Value Ref Range    Influenza A Ag, EIA Positive (A) Negative    Influenza B Ag, EIA Negative Negative   Rapid Strep A Screen - Swab, Throat   Result Value Ref Range    Strep A Ag Negative Negative   Blood Culture - Blood,   Result Value Ref Range    Blood Culture No growth at 5 days    Blood Culture - Blood,   Result Value Ref Range    Blood Culture No growth at 5 days    Beta Strep Culture, Throat - Swab, Throat   Result Value Ref Range    Throat Culture, Beta Strep No Beta Hemolytic Streptococcus Isolated    Respiratory Culture - Sputum, Cough   Result Value Ref Range    Respiratory Culture      Respiratory Culture Heavy growth (4+) Normal Respiratory Laurita     Gram Stain Many (4+) WBCs per low power field     Gram Stain Few (2+) Epithelial cells per low power field     Gram Stain Many (4+) Gram positive cocci in pairs and chains     Gram Stain Few (2+) Gram variable bacilli    Comprehensive Metabolic Panel   Result Value Ref Range    Glucose 127 (H) 70 - 100 mg/dL    BUN 12 9 - 23 mg/dL    Creatinine 1.00 0.60 - 1.30 mg/dL    Sodium 135 132 - 146 mmol/L    Potassium 3.5 3.5 - 5.5 mmol/L     Chloride 105 99 - 109 mmol/L    CO2 24.0 20.0 - 31.0 mmol/L    Calcium 9.2 8.7 - 10.4 mg/dL    Total Protein 7.0 5.7 - 8.2 g/dL    Albumin 4.40 3.20 - 4.80 g/dL    ALT (SGPT) 85 (H) 7 - 40 U/L    AST (SGOT) 43 (H) 0 - 33 U/L    Alkaline Phosphatase 132 (H) 25 - 100 U/L    Total Bilirubin 0.8 0.3 - 1.2 mg/dL    eGFR Non African Amer 77 >60 mL/min/1.73    Globulin 2.6 gm/dL    A/G Ratio 1.7 1.5 - 2.5 g/dL    BUN/Creatinine Ratio 12.0 7.0 - 25.0    Anion Gap 6.0 3.0 - 11.0 mmol/L   Urinalysis With / Culture If Indicated - Urine, Clean Catch   Result Value Ref Range    Color, UA Yellow Yellow, Straw    Appearance, UA Clear Clear    pH, UA 7.0 5.0 - 8.0    Specific Gravity, UA 1.020 1.001 - 1.030    Glucose, UA Negative Negative    Ketones, UA 80 mg/dL (3+) (A) Negative    Bilirubin, UA Negative Negative    Blood, UA Negative Negative    Protein, UA Negative Negative    Leuk Esterase, UA Negative Negative    Nitrite, UA Negative Negative    Urobilinogen, UA 0.2 E.U./dL 0.2 - 1.0 E.U./dL   CBC Auto Differential   Result Value Ref Range    WBC 9.24 3.50 - 10.80 10*3/mm3    RBC 4.84 4.20 - 5.76 10*6/mm3    Hemoglobin 14.6 13.1 - 17.5 g/dL    Hematocrit 42.9 38.9 - 50.9 %    MCV 88.6 80.0 - 99.0 fL    MCH 30.2 27.0 - 31.0 pg    MCHC 34.0 32.0 - 36.0 g/dL    RDW 13.4 11.3 - 14.5 %    RDW-SD 43.7 37.0 - 54.0 fl    MPV 9.9 6.0 - 12.0 fL    Platelets 201 150 - 450 10*3/mm3    Neutrophil % 81.0 (H) 41.0 - 71.0 %    Lymphocyte % 8.9 (L) 24.0 - 44.0 %    Monocyte % 9.7 0.0 - 12.0 %    Eosinophil % 0.1 0.0 - 3.0 %    Basophil % 0.1 0.0 - 1.0 %    Immature Grans % 0.2 0.0 - 0.6 %    Neutrophils, Absolute 7.48 1.50 - 8.30 10*3/mm3    Lymphocytes, Absolute 0.82 0.60 - 4.80 10*3/mm3    Monocytes, Absolute 0.90 0.00 - 1.00 10*3/mm3    Eosinophils, Absolute 0.01 0.00 - 0.30 10*3/mm3    Basophils, Absolute 0.01 0.00 - 0.20 10*3/mm3    Immature Grans, Absolute 0.02 0.00 - 0.03 10*3/mm3   Lactic Acid, Plasma   Result Value Ref Range     Lactate 0.7 0.5 - 2.0 mmol/L   Calcium, Ionized   Result Value Ref Range    Ionized Calcium 1.22 1.12 - 1.32 mmol/L   Magnesium   Result Value Ref Range    Magnesium 1.7 1.3 - 2.7 mg/dL   TSH   Result Value Ref Range    TSH 0.154 (L) 0.350 - 5.350 mIU/mL   CMV IgG IgM   Result Value Ref Range    CMV IgG 1.40 (H) 0.00 - 0.59 U/mL    CMV IgM <30.0 0.0 - 29.9 AU/mL   Hemoglobin A1c   Result Value Ref Range    Hemoglobin A1C 6.20 (H) 4.80 - 5.60 %   Comprehensive Metabolic Panel   Result Value Ref Range    Glucose 137 (H) 70 - 100 mg/dL    BUN 14 9 - 23 mg/dL    Creatinine 0.70 0.60 - 1.30 mg/dL    Sodium 137 132 - 146 mmol/L    Potassium 3.9 3.5 - 5.5 mmol/L    Chloride 104 99 - 109 mmol/L    CO2 24.0 20.0 - 31.0 mmol/L    Calcium 8.5 (L) 8.7 - 10.4 mg/dL    Total Protein 6.6 5.7 - 8.2 g/dL    Albumin 4.00 3.20 - 4.80 g/dL    ALT (SGPT) 54 (H) 7 - 40 U/L    AST (SGOT) 27 0 - 33 U/L    Alkaline Phosphatase 94 25 - 100 U/L    Total Bilirubin 0.5 0.3 - 1.2 mg/dL    eGFR Non African Amer 117 >60 mL/min/1.73    Globulin 2.6 gm/dL    A/G Ratio 1.5 1.5 - 2.5 g/dL    BUN/Creatinine Ratio 20.0 7.0 - 25.0    Anion Gap 9.0 3.0 - 11.0 mmol/L   Hepatitis Panel, Acute   Result Value Ref Range    Hepatitis B Surface Ag Non-Reactive Non-Reactive    Hep A IgM Non-Reactive Non-Reactive    Hep B C IgM Non-Reactive Non-Reactive    Hepatitis C Ab Non-Reactive Non-Reactive   T3, Free   Result Value Ref Range    T3, Free 2.0 2.0 - 4.4 pg/mL   Magnesium   Result Value Ref Range    Magnesium 2.3 1.3 - 2.7 mg/dL   CBC Auto Differential   Result Value Ref Range    WBC 13.70 (H) 3.50 - 10.80 10*3/mm3    RBC 4.59 4.20 - 5.76 10*6/mm3    Hemoglobin 13.5 13.1 - 17.5 g/dL    Hematocrit 41.1 38.9 - 50.9 %    MCV 89.5 80.0 - 99.0 fL    MCH 29.4 27.0 - 31.0 pg    MCHC 32.8 32.0 - 36.0 g/dL    RDW 13.5 11.3 - 14.5 %    RDW-SD 44.1 37.0 - 54.0 fl    MPV 9.9 6.0 - 12.0 fL    Platelets 213 150 - 450 10*3/mm3    Neutrophil % 84.7 (H) 41.0 - 71.0 %     Lymphocyte % 7.7 (L) 24.0 - 44.0 %    Monocyte % 7.3 0.0 - 12.0 %    Eosinophil % 0.0 0.0 - 3.0 %    Basophil % 0.0 0.0 - 1.0 %    Immature Grans % 0.3 0.0 - 0.6 %    Neutrophils, Absolute 11.61 (H) 1.50 - 8.30 10*3/mm3    Lymphocytes, Absolute 1.05 0.60 - 4.80 10*3/mm3    Monocytes, Absolute 1.00 0.00 - 1.00 10*3/mm3    Eosinophils, Absolute 0.00 0.00 - 0.30 10*3/mm3    Basophils, Absolute 0.00 0.00 - 0.20 10*3/mm3    Immature Grans, Absolute 0.04 (H) 0.00 - 0.03 10*3/mm3   Comprehensive Metabolic Panel   Result Value Ref Range    Glucose 112 (H) 70 - 100 mg/dL    BUN 17 9 - 23 mg/dL    Creatinine 0.70 0.60 - 1.30 mg/dL    Sodium 137 132 - 146 mmol/L    Potassium 3.7 3.5 - 5.5 mmol/L    Chloride 108 99 - 109 mmol/L    CO2 25.0 20.0 - 31.0 mmol/L    Calcium 8.1 (L) 8.7 - 10.4 mg/dL    Total Protein 5.7 5.7 - 8.2 g/dL    Albumin 3.40 3.20 - 4.80 g/dL    ALT (SGPT) 46 (H) 7 - 40 U/L    AST (SGOT) 30 0 - 33 U/L    Alkaline Phosphatase 79 25 - 100 U/L    Total Bilirubin 0.5 0.3 - 1.2 mg/dL    eGFR Non African Amer 117 >60 mL/min/1.73    Globulin 2.3 gm/dL    A/G Ratio 1.5 1.5 - 2.5 g/dL    BUN/Creatinine Ratio 24.3 7.0 - 25.0    Anion Gap 4.0 3.0 - 11.0 mmol/L   Vancomycin, Trough   Result Value Ref Range    Vancomycin Trough 1.50 (L) 10.00 - 20.00 mcg/mL        Counseling was given to patient for the following topics: appropriate exercise 150 minutes per week, disease prevention and healthy eating habits.    Plan of care reviewed with patient at the conclusion of today's visit. Education was provided regarding diagnosis, management, and any prescribed or recommended OTC medications.Patient verbalizes understanding of and agreement with management plan.         Bong Veloz MD

## 2019-06-14 ENCOUNTER — TELEPHONE (OUTPATIENT)
Dept: INTERNAL MEDICINE | Facility: CLINIC | Age: 59
End: 2019-06-14

## 2019-06-14 RX ORDER — OMEPRAZOLE 40 MG/1
40 CAPSULE, DELAYED RELEASE ORAL DAILY
Qty: 90 CAPSULE | Refills: 3 | Status: SHIPPED | OUTPATIENT
Start: 2019-06-14 | End: 2020-06-29

## 2019-06-14 NOTE — TELEPHONE ENCOUNTER
Called and taled to pt. Advised it had been submitted for PA and as of yesterday no determination. He can get a 90 day supply at [facility] with a Good Rx coupon for $12.06  ID  [id number]  BIN  [id number]  GRP  [id number]  PCN  [id number]    If no determination on PA today pt would like rx to [facility] with coupon

## 2019-06-14 NOTE — TELEPHONE ENCOUNTER
Called [facility] [phone] and spoke to Jacob. He states they did not rec PA. He will fax form again. When completed fax to [phone] and [phone].  Dx:  K22.70 [facility]          K21.9  Gerd

## 2019-06-14 NOTE — TELEPHONE ENCOUNTER
PATIENT IS REQUESTING MEDICAL NECESSITY TO BE PROVEN TO [facility] FOR HIS OMEPRAZOLE 40MG. [facility] WILL ONLY APPROVE 90 DAYS FOR THE WHOLE YEAR WHICH HAS ALREADY BEEN EXCEEDED DUE TO FACT HE TAKES IT DAILY.

## 2019-06-14 NOTE — TELEPHONE ENCOUNTER
Pt called stating he wanted the 90 day supply sent to [facility] because he can get it cheapest there with his coupon. Rx sent.

## 2019-06-24 ENCOUNTER — TELEPHONE (OUTPATIENT)
Dept: INTERNAL MEDICINE | Facility: CLINIC | Age: 59
End: 2019-06-24

## 2019-06-24 NOTE — TELEPHONE ENCOUNTER
PA approved for omeprazole 6/14/19 - 6/14/2020  auth #  [id number]. Called to [facility] [phone]  Shawna. Notified pt. He said he used a Good Rx coupon and got a 3 month supply at [facility] for $18. He wants to leave rx at [facility]

## 2019-08-09 ENCOUNTER — OFFICE VISIT (OUTPATIENT)
Dept: INTERNAL MEDICINE | Facility: CLINIC | Age: 59
End: 2019-08-09

## 2019-08-09 VITALS
SYSTOLIC BLOOD PRESSURE: 142 MMHG | HEART RATE: 88 BPM | BODY MASS INDEX: 27.7 KG/M2 | WEIGHT: 187 LBS | DIASTOLIC BLOOD PRESSURE: 88 MMHG | HEIGHT: 69 IN

## 2019-08-09 DIAGNOSIS — G89.29 CHRONIC LEFT SHOULDER PAIN: Primary | ICD-10-CM

## 2019-08-09 DIAGNOSIS — M25.512 CHRONIC LEFT SHOULDER PAIN: Primary | ICD-10-CM

## 2019-08-09 PROCEDURE — 99213 OFFICE O/P EST LOW 20 MIN: CPT | Performed by: INTERNAL MEDICINE

## 2019-08-09 RX ORDER — IBUPROFEN 200 MG
600 TABLET ORAL EVERY 6 HOURS PRN
COMMUNITY

## 2019-08-09 NOTE — PROGRESS NOTES
[facility]     Kayode Cline  1960   [id number]      Patient Care Team:  Bong Veloz MD as PCP - General    Chief Complaint::   Chief Complaint   Patient presents with   • Shoulder Pain     left        HPI  Mr. Cline comes in complaining of 2 months of left shoulder pain.  He remembers no injury.  He has pain in the shoulder down toward the biceps when he reaches behind him with the left arm.  He has no restriction overhead.  He has tried stretching exercises which she found online which have not helped.    Chronic Conditions:      Patient Active Problem List   Diagnosis   • Delgado esophagus   • GERD (gastroesophageal reflux disease)   • Benign essential hypertension   • Annual physical exam   • Prediabetes   • Non-seasonal allergic rhinitis due to pollen   • Induratio penis plastica   • Hypercholesterolemia        Past Medical History:   Diagnosis Date   • Delgado's esophagus 2008   • Esophageal candidiasis (CMS/[facility]) 5/8/2019   • Inability to swallow 1/15/2018   • Influenza A 1/15/2018       Past Surgical History:   Procedure Laterality Date   • ENDOSCOPY     • VASECTOMY  2002       Family History   Problem Relation Age of Onset   • Diabetes Mother    • Coronary artery disease Mother        Social History     Socioeconomic History   • Marital status:      Spouse name: Not on file   • Number of children: Not on file   • Years of education: Not on file   • Highest education level: Not on file   Tobacco Use   • Smoking status: Never Smoker   • Smokeless tobacco: Never Used   Substance and Sexual Activity   • Alcohol use: No   • Drug use: No   • Sexual activity: Defer     Partners: Female       No Known Allergies      Current Outpatient Medications:   •  ibuprofen (ADVIL,MOTRIN) 200 MG tablet, Take 600 mg by mouth 2 (Two) Times a Day. 3 x week, Disp: , Rfl:   •  omeprazole (priLOSEC) 40 MG capsule, Take 1 capsule by mouth Daily., Disp: 90 capsule, Rfl: 3  •  Cetirizine HCl  "(ZYRTEC ALLERGY) 10 MG capsule, Take 1 tablet by mouth Daily As Needed., Disp: , Rfl:   •  cyclobenzaprine (FLEXERIL) 10 MG tablet, Take 1 tablet by mouth 2 (Two) Times a Day As Needed for Muscle Spasms., Disp: 60 tablet, Rfl: 2    Review of Systems   Constitutional: Negative for chills, fatigue and fever.   HENT: Negative for congestion, ear pain and sinus pressure.    Respiratory: Negative for cough, chest tightness, shortness of breath and wheezing.    Cardiovascular: Negative for chest pain and palpitations.   Gastrointestinal: Negative for abdominal pain, blood in stool and constipation.   Skin: Negative for color change.   Allergic/Immunologic: Negative for environmental allergies.   Neurological: Negative for dizziness, speech difficulty and headache.   Psychiatric/Behavioral: Negative for decreased concentration. The patient is not nervous/anxious.         Vital Signs  Vitals:    08/09/19 1000   BP: 142/88   BP Location: Right arm   Patient Position: Sitting   Cuff Size: Adult   Pulse: 88   Weight: 84.8 kg (187 lb)   Height: 175.3 cm (69.02\")   PainSc:   6   PainLoc: Shoulder  Comment: worse at night       Physical Exam   Constitutional: He is oriented to person, place, and time. He appears well-developed and well-nourished.   Musculoskeletal:   There is no point tenderness over the left AC joint.  He has normal range of motion, but if he extends his left arm and supinates reaching the arm posteriorly he has discomfort in the shoulder.   Neurological: He is alert and oriented to person, place, and time.   Skin: Skin is warm and dry.      Procedures    ACE III MINI             Assessment/Plan:    Kayode was seen today for shoulder pain.    Diagnoses and all orders for this visit:    Chronic left shoulder pain  -     Ambulatory Referral to Physical Therapy Evaluate and treat    Plan    There is no evidence on examination of bursitis or rotator cuff injury.  This most likely represents tendinitis.  Since it has " lasted 2 months its unlikely to go away on its own.  He is given an order for physical therapy.  He may also try 400 mg of ibuprofen at bedtime for a week or so to see if that helps.      Plan of care reviewed with patient at the conclusion of today's visit. Education was provided regarding diagnosis, management, and any prescribed or recommended OTC medications.Patient verbalizes understanding of and agreement with management plan.         Bong Veloz MD

## 2019-08-15 ENCOUNTER — TELEPHONE (OUTPATIENT)
Dept: INTERNAL MEDICINE | Facility: CLINIC | Age: 59
End: 2019-08-15

## 2020-06-02 ENCOUNTER — OFFICE VISIT (OUTPATIENT)
Dept: INTERNAL MEDICINE | Facility: CLINIC | Age: 60
End: 2020-06-02

## 2020-06-02 ENCOUNTER — LAB (OUTPATIENT)
Dept: LAB | Facility: HOSPITAL | Age: 60
End: 2020-06-02

## 2020-06-02 VITALS
HEIGHT: 69 IN | WEIGHT: 188.8 LBS | DIASTOLIC BLOOD PRESSURE: 90 MMHG | HEART RATE: 76 BPM | SYSTOLIC BLOOD PRESSURE: 136 MMHG | BODY MASS INDEX: 27.96 KG/M2 | TEMPERATURE: 98 F

## 2020-06-02 DIAGNOSIS — Z12.5 PROSTATE CANCER SCREENING: ICD-10-CM

## 2020-06-02 DIAGNOSIS — K21.9 GASTROESOPHAGEAL REFLUX DISEASE WITHOUT ESOPHAGITIS: ICD-10-CM

## 2020-06-02 DIAGNOSIS — R73.03 PREDIABETES: ICD-10-CM

## 2020-06-02 DIAGNOSIS — K22.70 BARRETT'S ESOPHAGUS WITHOUT DYSPLASIA: ICD-10-CM

## 2020-06-02 DIAGNOSIS — E78.00 HYPERCHOLESTEROLEMIA: ICD-10-CM

## 2020-06-02 DIAGNOSIS — Z00.00 ANNUAL PHYSICAL EXAM: Primary | ICD-10-CM

## 2020-06-02 DIAGNOSIS — I10 BENIGN ESSENTIAL HYPERTENSION: ICD-10-CM

## 2020-06-02 DIAGNOSIS — L98.9 SKIN LESIONS: ICD-10-CM

## 2020-06-02 LAB
ALBUMIN SERPL-MCNC: 4.6 G/DL (ref 3.5–5.2)
ALBUMIN UR-MCNC: 1.4 MG/DL
ALBUMIN/GLOB SERPL: 1.8 G/DL
ALP SERPL-CCNC: 74 U/L (ref 39–117)
ALT SERPL W P-5'-P-CCNC: 28 U/L (ref 1–41)
ANION GAP SERPL CALCULATED.3IONS-SCNC: 10.2 MMOL/L (ref 5–15)
AST SERPL-CCNC: 27 U/L (ref 1–40)
BASOPHILS # BLD AUTO: 0.04 10*3/MM3 (ref 0–0.2)
BASOPHILS NFR BLD AUTO: 0.7 % (ref 0–1.5)
BILIRUB SERPL-MCNC: 0.6 MG/DL (ref 0.2–1.2)
BUN BLD-MCNC: 15 MG/DL (ref 6–20)
BUN/CREAT SERPL: 17.6 (ref 7–25)
CALCIUM SPEC-SCNC: 9.4 MG/DL (ref 8.6–10.5)
CHLORIDE SERPL-SCNC: 103 MMOL/L (ref 98–107)
CHOLEST SERPL-MCNC: 221 MG/DL (ref 0–200)
CO2 SERPL-SCNC: 26.8 MMOL/L (ref 22–29)
CREAT BLD-MCNC: 0.85 MG/DL (ref 0.76–1.27)
CREAT UR-MCNC: 213.6 MG/DL
DEPRECATED RDW RBC AUTO: 42.5 FL (ref 37–54)
EOSINOPHIL # BLD AUTO: 0.12 10*3/MM3 (ref 0–0.4)
EOSINOPHIL NFR BLD AUTO: 2.2 % (ref 0.3–6.2)
ERYTHROCYTE [DISTWIDTH] IN BLOOD BY AUTOMATED COUNT: 13.3 % (ref 12.3–15.4)
GFR SERPL CREATININE-BSD FRML MDRD: 93 ML/MIN/1.73
GLOBULIN UR ELPH-MCNC: 2.6 GM/DL
GLUCOSE BLD-MCNC: 107 MG/DL (ref 65–99)
HBA1C MFR BLD: 5.8 % (ref 4.8–5.6)
HCT VFR BLD AUTO: 41.9 % (ref 37.5–51)
HDLC SERPL-MCNC: 39 MG/DL (ref 40–60)
HGB BLD-MCNC: 14.2 G/DL (ref 13–17.7)
IMM GRANULOCYTES # BLD AUTO: 0.01 10*3/MM3 (ref 0–0.05)
IMM GRANULOCYTES NFR BLD AUTO: 0.2 % (ref 0–0.5)
LDLC SERPL CALC-MCNC: 136 MG/DL (ref 0–100)
LDLC/HDLC SERPL: 3.48 {RATIO}
LYMPHOCYTES # BLD AUTO: 1.83 10*3/MM3 (ref 0.7–3.1)
LYMPHOCYTES NFR BLD AUTO: 33.3 % (ref 19.6–45.3)
MCH RBC QN AUTO: 29.6 PG (ref 26.6–33)
MCHC RBC AUTO-ENTMCNC: 33.9 G/DL (ref 31.5–35.7)
MCV RBC AUTO: 87.3 FL (ref 79–97)
MICROALBUMIN/CREAT UR: 6.6 MG/G
MONOCYTES # BLD AUTO: 0.47 10*3/MM3 (ref 0.1–0.9)
MONOCYTES NFR BLD AUTO: 8.6 % (ref 5–12)
NEUTROPHILS # BLD AUTO: 3.02 10*3/MM3 (ref 1.7–7)
NEUTROPHILS NFR BLD AUTO: 55 % (ref 42.7–76)
NRBC BLD AUTO-RTO: 0.2 /100 WBC (ref 0–0.2)
PLATELET # BLD AUTO: 275 10*3/MM3 (ref 140–450)
PMV BLD AUTO: 10.4 FL (ref 6–12)
POTASSIUM BLD-SCNC: 3.8 MMOL/L (ref 3.5–5.2)
PROT SERPL-MCNC: 7.2 G/DL (ref 6–8.5)
PSA SERPL-MCNC: 4.37 NG/ML (ref 0–4)
RBC # BLD AUTO: 4.8 10*6/MM3 (ref 4.14–5.8)
SODIUM BLD-SCNC: 140 MMOL/L (ref 136–145)
TRIGL SERPL-MCNC: 231 MG/DL (ref 0–150)
VLDLC SERPL-MCNC: 46.2 MG/DL (ref 5–40)
WBC NRBC COR # BLD: 5.49 10*3/MM3 (ref 3.4–10.8)

## 2020-06-02 PROCEDURE — 80053 COMPREHEN METABOLIC PANEL: CPT

## 2020-06-02 PROCEDURE — 80061 LIPID PANEL: CPT

## 2020-06-02 PROCEDURE — 85025 COMPLETE CBC W/AUTO DIFF WBC: CPT

## 2020-06-02 PROCEDURE — 82043 UR ALBUMIN QUANTITATIVE: CPT

## 2020-06-02 PROCEDURE — G0103 PSA SCREENING: HCPCS

## 2020-06-02 PROCEDURE — 82570 ASSAY OF URINE CREATININE: CPT

## 2020-06-02 PROCEDURE — 83036 HEMOGLOBIN GLYCOSYLATED A1C: CPT

## 2020-06-02 PROCEDURE — 99396 PREV VISIT EST AGE 40-64: CPT | Performed by: INTERNAL MEDICINE

## 2020-06-02 NOTE — PROGRESS NOTES
[facility]     Kayode Cline  1960   [id number]      Patient Care Team:  Bong Veloz MD as PCP - General    Chief Complaint::   Chief Complaint   Patient presents with   • Annual Exam        HPI  Mr. Cline is now 59.  He comes in for follow-up of his hypertension, hyperlipidemia, prediabetes, Delgado's esophagus and for annual examination.  Overall he feels well.  He was avidly playing Frisbee golf getting 10-20,000 steps several days a week.  With the COVID pandemic he is gotten out of the habit but plans to resume.  He is taking precautions by wearing a mask and social distancing.  There is no fever, cough, shortness of breath or chest pain.  He has a letter from 2016 from Dr. Retana which states that his Delgado's esophagus had healed, that he should have follow-up EGD in 2019 and then after that he would not need it again.    Chronic Conditions:      Patient Active Problem List   Diagnosis   • Delgado esophagus   • GERD (gastroesophageal reflux disease)   • Benign essential hypertension   • Annual physical exam   • Prediabetes   • Non-seasonal allergic rhinitis due to pollen   • Induratio penis plastica   • Hypercholesterolemia        Past Medical History:   Diagnosis Date   • Delgado's esophagus    • Esophageal candidiasis (CMS/[facility]) 2019   • Inability to swallow 1/15/2018   • Influenza A 1/15/2018       Past Surgical History:   Procedure Laterality Date   • ENDOSCOPY     • HERNIA REPAIR      Infant   • VASECTOMY         Family History   Problem Relation Age of Onset   • Diabetes Mother          - Age 88   • Coronary artery disease Mother        Social History     Socioeconomic History   • Marital status:      Spouse name: Not on file   • Number of children: Not on file   • Years of education: Not on file   • Highest education level: Not on file   Tobacco Use   • Smoking status: Never Smoker   • Smokeless tobacco: Never Used   Substance and Sexual  "Activity   • Alcohol use: No   • Drug use: No   • Sexual activity: Yes     Partners: Female     Birth control/protection: Post-menopausal, Surgical       No Known Allergies      Current Outpatient Medications:   •  Cetirizine HCl (ZYRTEC ALLERGY) 10 MG capsule, Take 1 tablet by mouth Daily As Needed., Disp: , Rfl:   •  omeprazole (priLOSEC) 40 MG capsule, Take 1 capsule by mouth Daily., Disp: 90 capsule, Rfl: 3  •  ibuprofen (ADVIL,MOTRIN) 200 MG tablet, Take 600 mg by mouth 2 (Two) Times a Day. 3 x week, Disp: , Rfl:     Immunization History   Administered Date(s) Administered   • Flu Vaccine Quad PF >18YRS 02/29/2016, 09/16/2016, 10/26/2017, 09/02/2019   • Flu Vaccine Quad PF >36MO 08/25/2018   • Hepatitis A 08/25/2018, 03/25/2019        Health Maintenance Due   Topic Date Due   • TDAP/TD VACCINES (1 - Tdap) 07/02/1972   • ZOSTER VACCINE (1 of 2) 07/02/2011   • LIPID PANEL  05/29/2020   • ANNUAL PHYSICAL  05/30/2020        Review of Systems   Constitutional: Negative for chills, fatigue and fever.   HENT: Negative for congestion, ear pain and sinus pressure.    Respiratory: Negative for cough, chest tightness, shortness of breath and wheezing.    Cardiovascular: Negative for chest pain and palpitations.   Gastrointestinal: Negative for abdominal pain, blood in stool and constipation.   Skin: Negative for color change.   Allergic/Immunologic: Negative for environmental allergies.   Neurological: Negative for dizziness, speech difficulty and headache.   Psychiatric/Behavioral: Negative for decreased concentration. The patient is not nervous/anxious.         Vital Signs  Vitals:    06/02/20 0806   BP: 136/90   BP Location: Left arm   Patient Position: Sitting   Cuff Size: Adult   Pulse: 76   Temp: 98 °F (36.7 °C)   Weight: 85.6 kg (188 lb 12.8 oz)   Height: 175.3 cm (69.02\")   PainSc: 0-No pain       Physical Exam   Constitutional: He is oriented to person, place, and time. He appears well-developed and " well-nourished.   HENT:   Head: Normocephalic and atraumatic.   Right Ear: External ear normal.   Left Ear: External ear normal.   Nose: Nose normal.   Mouth/Throat: Oropharynx is clear and moist. No oropharyngeal exudate.   Eyes: Pupils are equal, round, and reactive to light. Conjunctivae and EOM are normal.   Neck: Normal range of motion. Neck supple. No JVD present. No thyromegaly present.   Cardiovascular: Normal rate, regular rhythm, normal heart sounds and intact distal pulses. Exam reveals no gallop and no friction rub.   No murmur heard.  Pulmonary/Chest: Effort normal and breath sounds normal. No respiratory distress. He has no wheezes. He has no rales. He exhibits no tenderness.   Abdominal: Soft. Bowel sounds are normal. He exhibits no distension and no mass. There is no tenderness. There is no rebound and no guarding. No hernia.   Musculoskeletal: Normal range of motion. He exhibits no tenderness.   Lymphadenopathy:     He has no cervical adenopathy.   Neurological: He is alert and oriented to person, place, and time. He displays normal reflexes. No cranial nerve deficit or sensory deficit. He exhibits normal muscle tone. Coordination normal.   Skin: Skin is warm and dry. No rash noted. No erythema.   Scattered benign-appearing skin lesions.   Psychiatric: He has a normal mood and affect. His behavior is normal. Judgment and thought content normal.   Nursing note and vitals reviewed.     Procedures     Fall Risk Screen:  STEADI Fall Risk Assessment has not been completed.    Health Habits and Functional and Cognitive Screening:  No flowsheet data found.    Depression [place]  PHQ-9 Total Score:       ACE III MINI             Assessment/Plan:    Kayode was seen today for annual exam.    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  He is done a good job maintaining his weight loss.  Colonoscopy was performed in 2018, and due at 5 years due to family history of polyps.    He is referred to GI for follow-up EGD.  I will leave it to his gastroenterologist to decide if he needs further surveillance after that.    Blood pressures well controlled currently on nonpharmacologic therapy.  He will continue exercise, healthy diet and avoidance of weight gain and salt restriction.    Lipid panel is pending, again he will continue working on healthy diet.    A1c is pending, see dietary instructions above.    He is referred to dermatology for evaluation of his skin lesions.    Labs  Results for orders placed or performed in visit on 05/29/19   Hemoglobin A1c   Result Value Ref Range    Hemoglobin A1C 5.40 4.80 - 5.60 %   Microalbumin / Creatinine Urine Ratio - Urine, Clean Catch   Result Value Ref Range    Microalbumin/Creatinine Ratio  mg/g    Creatinine, Urine 189.8 mg/dL    Microalbumin, Urine <1.2 mg/L   Comprehensive Metabolic Panel   Result Value Ref Range    Glucose 106 (H) 65 - 99 mg/dL    BUN 16 6 - 20 mg/dL    Creatinine 0.84 0.76 - 1.27 mg/dL    Sodium 144 136 - 145 mmol/L    Potassium 4.6 3.5 - 5.2 mmol/L    Chloride 107 98 - 107 mmol/L    CO2 25.6 22.0 - 29.0 mmol/L    Calcium 8.8 8.6 - 10.5 mg/dL    Total Protein 6.9 6.0 - 8.5 g/dL    Albumin 4.60 3.50 - 5.20 g/dL    ALT (SGPT) 37 1 - 41 U/L    AST (SGOT) 35 1 - 40 U/L    Alkaline Phosphatase 80 39 - 117 U/L    Total Bilirubin 0.4 0.2 - 1.2 mg/dL    eGFR Non   Amer 94 >60 mL/min/1.73    Globulin 2.3 gm/dL    A/G Ratio 2.0 g/dL    BUN/Creatinine Ratio 19.0 7.0 - 25.0    Anion Gap 11.4 mmol/L   Lipid Panel   Result Value Ref Range    Total Cholesterol 152 0 - 200 mg/dL    Triglycerides 66 0 - 150 mg/dL    HDL Cholesterol 47 40 - 60 mg/dL    LDL Cholesterol  92 0 - 100 mg/dL    VLDL Cholesterol 13.2 5 - 40 mg/dL    LDL/HDL Ratio 1.95    PSA Screen   Result Value Ref Range    PSA 2.970 0.000 - 4.000 ng/mL   Hepatitis C antibody   Result Value Ref Range    Hepatitis C Ab Non-Reactive Non-Reactive   CBC Auto Differential   Result Value Ref Range    WBC 4.06 3.40 - 10.80 10*3/mm3    RBC 4.58 4.14 - 5.80 10*6/mm3    Hemoglobin 13.3 13.0 - 17.7 g/dL    Hematocrit 42.7 37.5 - 51.0 %    MCV 93.2 79.0 - 97.0 fL    MCH 29.0 26.6 - 33.0 pg    MCHC 31.1 (L) 31.5 - 35.7 g/dL    RDW 12.8 12.3 - 15.4 %    RDW-SD 43.6 37.0 - 54.0 fl    MPV 11.2 6.0 - 12.0 fL    Platelets 262 140 - 450 10*3/mm3    Neutrophil % 53.0 42.7 - 76.0 %    Lymphocyte % 32.5 19.6 - 45.3 %    Monocyte % 9.4 5.0 - 12.0 %    Eosinophil % 4.4 0.3 - 6.2 %    Basophil % 0.5 0.0 - 1.5 %    Immature Grans % 0.2 0.0 - 0.5 %    Neutrophils, Absolute 2.15 1.70 - 7.00 10*3/mm3    Lymphocytes, Absolute 1.32 0.70 - 3.10 10*3/mm3    Monocytes, Absolute 0.38 0.10 - 0.90 10*3/mm3    Eosinophils, Absolute 0.18 0.00 - 0.40 10*3/mm3    Basophils, Absolute 0.02 0.00 - 0.20 10*3/mm3    Immature Grans, Absolute 0.01 0.00 - 0.05 10*3/mm3    nRBC 0.0 0.0 - 0.2 /100 WBC        Counseling was given to patient for the following topics: appropriate exercise 150 minutes/week, disease prevention and healthy eating habits.    Plan of care reviewed with patient at the conclusion of today's visit. Education was provided regarding diagnosis, management, and any prescribed or recommended OTC medications.Patient verbalizes understanding of and agreement with management plan.         Bong Veloz MD

## 2020-06-07 DIAGNOSIS — R97.20 ABNORMAL PSA: Primary | ICD-10-CM

## 2020-06-08 ENCOUNTER — LAB (OUTPATIENT)
Dept: LAB | Facility: HOSPITAL | Age: 60
End: 2020-06-08

## 2020-06-08 DIAGNOSIS — R97.20 ABNORMAL PSA: ICD-10-CM

## 2020-06-08 LAB — PSA SERPL-MCNC: 4.4 NG/ML (ref 0–4)

## 2020-06-08 PROCEDURE — 84153 ASSAY OF PSA TOTAL: CPT

## 2020-06-12 DIAGNOSIS — R97.20 ABNORMAL PSA: Primary | ICD-10-CM

## 2020-06-17 ENCOUNTER — TELEPHONE (OUTPATIENT)
Dept: INTERNAL MEDICINE | Facility: CLINIC | Age: 60
End: 2020-06-17

## 2020-06-17 NOTE — TELEPHONE ENCOUNTER
Referral was placed. I looked in the referral comments and it looks like where you were sending him they do not accept his insurance and they are waiting for a response from you. If not addressed already please check your basket for a message from referrals.

## 2020-06-17 NOTE — TELEPHONE ENCOUNTER
PATIENT WAS CHECKING IN ON THE UROLOGIST YOU WERE SETTING HIM UP WITH AND THE REFERRAL FOR IT BECAUSE OF SOME THINGS ON HIS BLOOD WORK RESULTS    PATIENT PH: [phone]

## 2020-06-29 RX ORDER — OMEPRAZOLE 40 MG/1
CAPSULE, DELAYED RELEASE ORAL
Qty: 30 CAPSULE | Refills: 5 | Status: SHIPPED | OUTPATIENT
Start: 2020-06-29 | End: 2021-01-25

## 2020-07-10 ENCOUNTER — APPOINTMENT (OUTPATIENT)
Dept: PREADMISSION TESTING | Facility: HOSPITAL | Age: 60
End: 2020-07-10

## 2020-07-10 PROCEDURE — C9803 HOPD COVID-19 SPEC COLLECT: HCPCS

## 2020-07-10 PROCEDURE — U0004 COV-19 TEST NON-CDC HGH THRU: HCPCS

## 2020-07-10 PROCEDURE — U0002 COVID-19 LAB TEST NON-CDC: HCPCS

## 2020-07-11 LAB
REF LAB TEST METHOD: NORMAL
SARS-COV-2 RNA RESP QL NAA+PROBE: NOT DETECTED

## 2020-07-13 ENCOUNTER — LAB REQUISITION (OUTPATIENT)
Dept: LAB | Facility: HOSPITAL | Age: 60
End: 2020-07-13

## 2020-07-13 ENCOUNTER — OUTSIDE FACILITY SERVICE (OUTPATIENT)
Dept: GASTROENTEROLOGY | Facility: CLINIC | Age: 60
End: 2020-07-13

## 2020-07-13 DIAGNOSIS — Z87.19 PERSONAL HISTORY OF OTHER DISEASES OF THE DIGESTIVE SYSTEM: ICD-10-CM

## 2020-07-13 PROCEDURE — 43239 EGD BIOPSY SINGLE/MULTIPLE: CPT | Performed by: INTERNAL MEDICINE

## 2020-07-13 PROCEDURE — 88305 TISSUE EXAM BY PATHOLOGIST: CPT | Performed by: INTERNAL MEDICINE

## 2020-07-14 LAB
CYTO UR: NORMAL
LAB AP CASE REPORT: NORMAL
LAB AP CLINICAL INFORMATION: NORMAL
PATH REPORT.FINAL DX SPEC: NORMAL
PATH REPORT.GROSS SPEC: NORMAL

## 2021-01-25 RX ORDER — OMEPRAZOLE 40 MG/1
CAPSULE, DELAYED RELEASE ORAL
Qty: 30 CAPSULE | Refills: 5 | Status: SHIPPED | OUTPATIENT
Start: 2021-01-25 | End: 2021-06-03 | Stop reason: SDUPTHER

## 2021-06-03 ENCOUNTER — OFFICE VISIT (OUTPATIENT)
Dept: INTERNAL MEDICINE | Facility: CLINIC | Age: 61
End: 2021-06-03

## 2021-06-03 ENCOUNTER — LAB (OUTPATIENT)
Dept: LAB | Facility: HOSPITAL | Age: 61
End: 2021-06-03

## 2021-06-03 VITALS
HEIGHT: 70 IN | HEART RATE: 72 BPM | SYSTOLIC BLOOD PRESSURE: 124 MMHG | TEMPERATURE: 97.8 F | DIASTOLIC BLOOD PRESSURE: 84 MMHG | BODY MASS INDEX: 26.26 KG/M2 | WEIGHT: 183.4 LBS

## 2021-06-03 DIAGNOSIS — I10 BENIGN ESSENTIAL HYPERTENSION: ICD-10-CM

## 2021-06-03 DIAGNOSIS — R97.20 ABNORMAL PSA: ICD-10-CM

## 2021-06-03 DIAGNOSIS — K21.9 GASTROESOPHAGEAL REFLUX DISEASE WITHOUT ESOPHAGITIS: ICD-10-CM

## 2021-06-03 DIAGNOSIS — Z00.00 ANNUAL PHYSICAL EXAM: Primary | ICD-10-CM

## 2021-06-03 DIAGNOSIS — R73.03 PREDIABETES: ICD-10-CM

## 2021-06-03 DIAGNOSIS — E78.00 HYPERCHOLESTEROLEMIA: ICD-10-CM

## 2021-06-03 DIAGNOSIS — K22.70 BARRETT'S ESOPHAGUS WITHOUT DYSPLASIA: ICD-10-CM

## 2021-06-03 LAB
ALBUMIN SERPL-MCNC: 4.6 G/DL (ref 3.5–5.2)
ALBUMIN UR-MCNC: <1.2 MG/DL
ALBUMIN/GLOB SERPL: 1.8 G/DL
ALP SERPL-CCNC: 77 U/L (ref 39–117)
ALT SERPL W P-5'-P-CCNC: 18 U/L (ref 1–41)
ANION GAP SERPL CALCULATED.3IONS-SCNC: 10.7 MMOL/L (ref 5–15)
AST SERPL-CCNC: 25 U/L (ref 1–40)
BASOPHILS # BLD AUTO: 0.02 10*3/MM3 (ref 0–0.2)
BASOPHILS NFR BLD AUTO: 0.4 % (ref 0–1.5)
BILIRUB SERPL-MCNC: 0.7 MG/DL (ref 0–1.2)
BUN SERPL-MCNC: 15 MG/DL (ref 6–20)
BUN/CREAT SERPL: 15.6 (ref 7–25)
CALCIUM SPEC-SCNC: 9.3 MG/DL (ref 8.6–10.5)
CHLORIDE SERPL-SCNC: 103 MMOL/L (ref 98–107)
CHOLEST SERPL-MCNC: 181 MG/DL (ref 0–200)
CO2 SERPL-SCNC: 25.3 MMOL/L (ref 22–29)
CREAT SERPL-MCNC: 0.96 MG/DL (ref 0.76–1.27)
CREAT UR-MCNC: 209.4 MG/DL
DEPRECATED RDW RBC AUTO: 43 FL (ref 37–54)
EOSINOPHIL # BLD AUTO: 0.15 10*3/MM3 (ref 0–0.4)
EOSINOPHIL NFR BLD AUTO: 3.3 % (ref 0.3–6.2)
ERYTHROCYTE [DISTWIDTH] IN BLOOD BY AUTOMATED COUNT: 13.1 % (ref 12.3–15.4)
GFR SERPL CREATININE-BSD FRML MDRD: 80 ML/MIN/1.73
GLOBULIN UR ELPH-MCNC: 2.5 GM/DL
GLUCOSE SERPL-MCNC: 89 MG/DL (ref 65–99)
HBA1C MFR BLD: 5.79 % (ref 4.8–5.6)
HCT VFR BLD AUTO: 44.9 % (ref 37.5–51)
HDLC SERPL-MCNC: 44 MG/DL (ref 40–60)
HGB BLD-MCNC: 14.9 G/DL (ref 13–17.7)
IMM GRANULOCYTES # BLD AUTO: 0 10*3/MM3 (ref 0–0.05)
IMM GRANULOCYTES NFR BLD AUTO: 0 % (ref 0–0.5)
LDLC SERPL CALC-MCNC: 125 MG/DL (ref 0–100)
LDLC/HDLC SERPL: 2.82 {RATIO}
LYMPHOCYTES # BLD AUTO: 1.59 10*3/MM3 (ref 0.7–3.1)
LYMPHOCYTES NFR BLD AUTO: 35.1 % (ref 19.6–45.3)
MCH RBC QN AUTO: 30.2 PG (ref 26.6–33)
MCHC RBC AUTO-ENTMCNC: 33.2 G/DL (ref 31.5–35.7)
MCV RBC AUTO: 90.9 FL (ref 79–97)
MICROALBUMIN/CREAT UR: NORMAL MG/G{CREAT}
MONOCYTES # BLD AUTO: 0.43 10*3/MM3 (ref 0.1–0.9)
MONOCYTES NFR BLD AUTO: 9.5 % (ref 5–12)
NEUTROPHILS NFR BLD AUTO: 2.34 10*3/MM3 (ref 1.7–7)
NEUTROPHILS NFR BLD AUTO: 51.7 % (ref 42.7–76)
NRBC BLD AUTO-RTO: 0 /100 WBC (ref 0–0.2)
PLATELET # BLD AUTO: 305 10*3/MM3 (ref 140–450)
PMV BLD AUTO: 10.7 FL (ref 6–12)
POTASSIUM SERPL-SCNC: 4.2 MMOL/L (ref 3.5–5.2)
PROT SERPL-MCNC: 7.1 G/DL (ref 6–8.5)
PSA SERPL-MCNC: 3.51 NG/ML (ref 0–4)
RBC # BLD AUTO: 4.94 10*6/MM3 (ref 4.14–5.8)
SODIUM SERPL-SCNC: 139 MMOL/L (ref 136–145)
TRIGL SERPL-MCNC: 65 MG/DL (ref 0–150)
VLDLC SERPL-MCNC: 12 MG/DL (ref 5–40)
WBC # BLD AUTO: 4.53 10*3/MM3 (ref 3.4–10.8)

## 2021-06-03 PROCEDURE — 85025 COMPLETE CBC W/AUTO DIFF WBC: CPT

## 2021-06-03 PROCEDURE — 82043 UR ALBUMIN QUANTITATIVE: CPT

## 2021-06-03 PROCEDURE — 84153 ASSAY OF PSA TOTAL: CPT

## 2021-06-03 PROCEDURE — 83036 HEMOGLOBIN GLYCOSYLATED A1C: CPT

## 2021-06-03 PROCEDURE — 99396 PREV VISIT EST AGE 40-64: CPT | Performed by: INTERNAL MEDICINE

## 2021-06-03 PROCEDURE — 80053 COMPREHEN METABOLIC PANEL: CPT

## 2021-06-03 PROCEDURE — 82570 ASSAY OF URINE CREATININE: CPT

## 2021-06-03 PROCEDURE — 80061 LIPID PANEL: CPT

## 2021-06-03 RX ORDER — OMEPRAZOLE 40 MG/1
40 CAPSULE, DELAYED RELEASE ORAL DAILY
Qty: 30 CAPSULE | Refills: 11 | Status: SHIPPED | OUTPATIENT
Start: 2021-06-03 | End: 2021-12-03 | Stop reason: SDUPTHER

## 2021-06-03 NOTE — PROGRESS NOTES
[facility]     Kayode Cline  1960   [id number]      Patient Care Team:  Bong Veloz MD as PCP - General    Chief Complaint::   Chief Complaint   Patient presents with   • Annual Exam   • Hyperlipidemia   • Hypertension        HPI  Mr. Cline is now 60.  He comes in for follow-up of his hypertension, hyperlipidemia, prediabetes, GERD with history of Delgado's esophagus, abnormal PSA and for annual examination.  Last year he underwent prostate biopsy which was negative.  He also underwent EGD which was negative for Delgado's.  He saw dermatology who suggested that the lesion on the right he will be biopsied but he declined.  It has not changed.  Overall he feels very well.  He is fairly active.  Over the past 6 weeks he has intentionally reduced sugar and is lost 10 to 15 pounds.  He is fully vaccinated against COVID-19.  There is no fever, cough, shortness of breath or chest pain.    Chronic Conditions:      Patient Active Problem List   Diagnosis   • Delgado esophagus   • GERD (gastroesophageal reflux disease)   • Benign essential hypertension   • Annual physical exam   • Prediabetes   • Non-seasonal allergic rhinitis due to pollen   • Induratio penis plastica   • Hypercholesterolemia        Past Medical History:   Diagnosis Date   • Delgado's esophagus    • Esophageal candidiasis (CMS/[facility]) 2019   • Inability to swallow 1/15/2018   • Influenza A 1/15/2018       Past Surgical History:   Procedure Laterality Date   • ENDOSCOPY     • HERNIA REPAIR      Infant   • PROSTATE BIOPSY  10/2020    normal   • VASECTOMY         Family History   Problem Relation Age of Onset   • Diabetes Mother          - Age 88   • Coronary artery disease Mother        Social History     Socioeconomic History   • Marital status:      Spouse name: Not on file   • Number of children: Not on file   • Years of education: Not on file   • Highest education level: Not on file   Tobacco Use    • Smoking status: Never Smoker   • Smokeless tobacco: Never Used   Substance and Sexual Activity   • Alcohol use: No   • Drug use: No   • Sexual activity: Yes     Partners: Female     Birth control/protection: Post-menopausal, Surgical       No Known Allergies      Current Outpatient Medications:   •  Cetirizine HCl (ZYRTEC ALLERGY) 10 MG capsule, Take 1 tablet by mouth Daily As Needed., Disp: , Rfl:   •  ibuprofen (ADVIL,MOTRIN) 200 MG tablet, Take 600 mg by mouth 2 (Two) Times a Day. 3 x week, Disp: , Rfl:   •  omeprazole (priLOSEC) 40 MG capsule, Take 1 capsule by mouth Daily., Disp: 30 capsule, Rfl: 11    Immunization History   Administered Date(s) Administered   • COVID-19 (PFIZER) 03/01/2021, 03/29/2021   • Flu Vaccine Quad PF >18YRS 02/29/2016, 09/16/2016, 10/26/2017, 09/02/2019   • Flu Vaccine Quad PF >36MO 08/25/2018   • Flulaval/Fluarix/Fluzone Quad 09/09/2019, 09/15/2020   • Hepatitis A 08/25/2018, 03/25/2019        Health Maintenance Due   Topic Date Due   • TDAP/TD VACCINES (1 - Tdap) Never done   • ZOSTER VACCINE (1 of 2) Never done   • LIPID PANEL  06/02/2021   • ANNUAL PHYSICAL  06/03/2021        Review of Systems   Constitutional: Negative for activity change, chills, fatigue, fever, unexpected weight gain and unexpected weight loss.   HENT: Negative for congestion, ear pain, hearing loss, postnasal drip and trouble swallowing.    Eyes: Negative for blurred vision and visual disturbance.   Respiratory: Negative for cough and shortness of breath.    Cardiovascular: Negative for chest pain, palpitations and leg swelling.   Gastrointestinal: Negative for abdominal pain, blood in stool, constipation, diarrhea and indigestion.   Endocrine: Negative for cold intolerance, heat intolerance, polydipsia and polyuria.   Genitourinary: Negative for difficulty urinating, discharge, erectile dysfunction and testicular pain.   Musculoskeletal: Negative for back pain, gait problem, joint swelling and myalgias.  "  Skin: Negative for skin lesions.   Allergic/Immunologic: Negative for environmental allergies.   Neurological: Negative for dizziness, syncope, speech difficulty, weakness, numbness, headache, memory problem and confusion.   Hematological: Negative for adenopathy. Does not bruise/bleed easily.   Psychiatric/Behavioral: Negative for decreased concentration, sleep disturbance, depressed mood and stress. The patient is not nervous/anxious.         Vital Signs  Vitals:    06/03/21 0803   BP: 124/84   BP Location: Left arm   Patient Position: Sitting   Cuff Size: Adult   Pulse: 72   Temp: 97.8 °F (36.6 °C)   Weight: 83.2 kg (183 lb 6.4 oz)   Height: 177.8 cm (70\")   PainSc: 0-No pain       Physical Exam  Vitals and nursing note reviewed.   Constitutional:       Appearance: He is well-developed.   HENT:      Head: Normocephalic and atraumatic.      Right Ear: External ear normal.      Left Ear: External ear normal.      Nose: Nose normal.      Mouth/Throat:      Pharynx: No oropharyngeal exudate.   Eyes:      Conjunctiva/sclera: Conjunctivae normal.      Pupils: Pupils are equal, round, and reactive to light.   Neck:      Thyroid: No thyromegaly.      Vascular: No JVD.   Cardiovascular:      Rate and Rhythm: Normal rate and regular rhythm.      Heart sounds: Normal heart sounds. No murmur heard.   No friction rub. No gallop.    Pulmonary:      Effort: Pulmonary effort is normal. No respiratory distress.      Breath sounds: Normal breath sounds. No wheezing or rales.   Chest:      Chest wall: No tenderness.   Abdominal:      General: Bowel sounds are normal. There is no distension.      Palpations: Abdomen is soft. There is no mass.      Tenderness: There is no abdominal tenderness. There is no guarding or rebound.      Hernia: No hernia is present.   Musculoskeletal:         General: No tenderness. Normal range of motion.      Cervical back: Normal range of motion and neck supple.   Lymphadenopathy:      Cervical: No " cervical adenopathy.   Skin:     General: Skin is warm and dry.      Findings: No erythema or rash.      Comments: There is a 2cm ovoid, soft fleshy mass on the medial aspect of the right heel which appears unchanged from previous.   Neurological:      Mental Status: He is alert and oriented to person, place, and time.      Cranial Nerves: No cranial nerve deficit.      Sensory: No sensory deficit.      Motor: No abnormal muscle tone.      Coordination: Coordination normal.      Deep Tendon Reflexes: Reflexes normal.   Psychiatric:         Behavior: Behavior normal.         Thought Content: Thought content normal.         Judgment: Judgment normal.          Procedures     Fall Risk Screen:  [facility].    Health Habits and Functional and Cognitive Screening:  No flowsheet data found.    Depression [place]  PHQ-9 Total Score: 0     ACE III MINI             Assessment/Plan:    Diagnoses and all orders for this visit:    1. Annual physical exam (Primary)    2. Benign essential hypertension  -     CBC & Differential; Future  -     Lipid Panel; Future  -     Microalbumin / Creatinine Urine Ratio - Urine, Clean Catch; Future    3. Hypercholesterolemia  -     Comprehensive Metabolic Panel; Future  -     Lipid Panel; Future    4. Prediabetes  -     Hemoglobin A1c; Future    5. Gastroesophageal reflux disease without esophagitis    6. 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 Abnormal PSA  -     PSA DIAGNOSTIC; Future    Other orders  -     omeprazole (priLOSEC) 40 MG capsule; Take 1 capsule by mouth Daily.  Dispense: 30 capsule; Refill: 11    Plan     Overall he remains in good health with reasonably good lifestyle habits, although I encouraged him to follow a healthy diet and keep his weight at this level year-round instead of simply 6 weeks prior to his physical.  Colonoscopy was performed in 2018 and is due in 2013 because of family history of polyps.  He is not actually clear  that his mother had polyps and will check on that.  He is fully vaccinated against COVID-19.    Blood pressure is well controlled with nonpharmacologic therapy.    Lipid panel is pending, he will continue healthy diet and efforts at weight control.    A1c is pending, the treatment remains avoidance of weight gain.    GERD is controlled with omeprazole with recent EGD negative for changes of Delgado's.  He is to follow-up at 5 years.    Abnormal PSA with normal prostate biopsies.  We will plan to follow semiannually as long as there is no consistently upward trend.    Patient's Body mass index is 26.32 kg/m². indicating that he is overweight (BMI 25-29.9). Obesity-related health conditions include the following: dyslipidemias. Obesity is improving with lifestyle modifications. BMI is is above average; BMI management plan is completed. We discussed portion control, increasing exercise and joining a fitness center or start home based exercise program..      Labs  Results for orders placed or performed in visit on 07/13/20   Tissue Pathology Exam    Specimen: Esophagus; Tissue   Result Value Ref Range    Case Report       Surgical Pathology Report                         Case: [id number]                                  Authorizing Provider:  Deon Durham MD    Collected:           07/13/2020 11:20 AM          Ordering Location:     [facility]   Received:            07/13/2020 11:33 AM                                 LABORATORY                                                                   Pathologist:           Mario Sanchez MD                                                           Specimen:    Esophagus, EG junction                                                                     Clinical Information       The working history is Delgado's esophagus surveillance, small hiatal hernia, irregular Z-line and salmon colored mucosa.      Final Diagnosis       ESOPHAGEAL JUNCTION BIOPSIES       Gastroesophageal mucosa with chronic inflammatory changes compatible with reflux      Negative for Delgado's metaplasia and eosinophilic esophagitis (0-2 eos/hpf)      Negative for dysplasia and neoplasia      Gross Description       Received in formalin labeled as EG junction biopsy consists of a 0.3 x 0.3 x 0.2 cm tan/pink soft tissue fragments submitted in toto in 1 cassette.  HBM/dlb       Microscopic Description       The slides are reviewed and demonstrate histopathologic features supporting the above rendered diagnosis.              Counseling was given to patient for the following topics: appropriate exercise under 50 minutes/week, disease prevention and healthy eating habits.    Plan of care reviewed with patient at the conclusion of today's visit. Education was provided regarding diagnosis, management, and any prescribed or recommended OTC medications.Patient verbalizes understanding of and agreement with management plan.         Bong Veloz MD

## 2021-10-19 ENCOUNTER — TELEPHONE (OUTPATIENT)
Dept: INTERNAL MEDICINE | Facility: CLINIC | Age: 61
End: 2021-10-19

## 2021-10-19 ENCOUNTER — OFFICE VISIT (OUTPATIENT)
Dept: INTERNAL MEDICINE | Facility: CLINIC | Age: 61
End: 2021-10-19

## 2021-10-19 VITALS
TEMPERATURE: 98.4 F | HEIGHT: 70 IN | BODY MASS INDEX: 27.8 KG/M2 | DIASTOLIC BLOOD PRESSURE: 84 MMHG | HEART RATE: 104 BPM | WEIGHT: 194.2 LBS | SYSTOLIC BLOOD PRESSURE: 136 MMHG

## 2021-10-19 DIAGNOSIS — J18.9 COMMUNITY ACQUIRED PNEUMONIA OF LEFT LOWER LOBE OF LUNG: Primary | ICD-10-CM

## 2021-10-19 DIAGNOSIS — J02.9 ACUTE PHARYNGITIS, UNSPECIFIED ETIOLOGY: ICD-10-CM

## 2021-10-19 LAB
EXPIRATION DATE: NORMAL
INTERNAL CONTROL: NORMAL
Lab: NORMAL
S PYO RRNA THROAT QL PROBE: NEGATIVE

## 2021-10-19 PROCEDURE — U0004 COV-19 TEST NON-CDC HGH THRU: HCPCS | Performed by: INTERNAL MEDICINE

## 2021-10-19 PROCEDURE — 99213 OFFICE O/P EST LOW 20 MIN: CPT | Performed by: INTERNAL MEDICINE

## 2021-10-19 PROCEDURE — 87651 STREP A DNA AMP PROBE: CPT | Performed by: INTERNAL MEDICINE

## 2021-10-19 RX ORDER — AMOXICILLIN AND CLAVULANATE POTASSIUM 250; 62.5 MG/5ML; MG/5ML
750 POWDER, FOR SUSPENSION ORAL 2 TIMES DAILY
Qty: 210 ML | Refills: 0 | Status: SHIPPED | OUTPATIENT
Start: 2021-10-19 | End: 2021-10-19

## 2021-10-19 RX ORDER — AMOXICILLIN AND CLAVULANATE POTASSIUM 875; 125 MG/1; MG/1
1 TABLET, FILM COATED ORAL 2 TIMES DAILY
Qty: 14 TABLET | Refills: 0 | Status: SHIPPED | OUTPATIENT
Start: 2021-10-19 | End: 2021-12-03

## 2021-10-19 RX ORDER — DOXYCYCLINE HYCLATE 100 MG/1
100 CAPSULE ORAL 2 TIMES DAILY
Qty: 14 CAPSULE | Refills: 0 | Status: SHIPPED | OUTPATIENT
Start: 2021-10-19 | End: 2021-12-03

## 2021-10-19 NOTE — TELEPHONE ENCOUNTER
Augmentin is a tablet that can be crushed.  Doxycycline is a capsule, hopefully he can swallow that.

## 2021-10-19 NOTE — PROGRESS NOTES
[facility]     Kayode Cline  1960   [id number]      Patient Care Team:  Bong Veloz MD as PCP - General    Chief Complaint::   Chief Complaint   Patient presents with   • Cough   • Sore Throat        HPI  Mr. Cline noted the onset of a funny feeling in his throat for several days and then body aches.  He messaged us yesterday that at that point he had a sore throat and difficulty swallowing.  He had cold sweats and felt feverish.  Because he had history of throat infection which at 1 point ended in hospitalization, he was very concerned.  He was told to come in today.  Today he has sore throat, fatigue, and cough.    Chronic Conditions:      Patient Active Problem List   Diagnosis   • Delgado esophagus   • GERD (gastroesophageal reflux disease)   • Benign essential hypertension   • Annual physical exam   • Prediabetes   • Non-seasonal allergic rhinitis due to pollen   • Induratio penis plastica   • Hypercholesterolemia        Past Medical History:   Diagnosis Date   • Delgado's esophagus    • Esophageal candidiasis (HCC) 2019   • Inability to swallow 1/15/2018   • Influenza A 1/15/2018       Past Surgical History:   Procedure Laterality Date   • ENDOSCOPY     • HERNIA REPAIR      Infant   • PROSTATE BIOPSY  10/2020    normal   • VASECTOMY         Family History   Problem Relation Age of Onset   • Diabetes Mother          - Age 88   • Coronary artery disease Mother        Social History     Socioeconomic History   • Marital status:    Tobacco Use   • Smoking status: Never Smoker   • Smokeless tobacco: Never Used   Substance and Sexual Activity   • Alcohol use: No   • Drug use: No   • Sexual activity: Yes     Partners: Female     Birth control/protection: Post-menopausal, Surgical       No Known Allergies      Current Outpatient Medications:   •  Cetirizine HCl (ZYRTEC ALLERGY) 10 MG capsule, Take 1 tablet by mouth Daily As Needed., Disp: , Rfl:   •   "ibuprofen (ADVIL,MOTRIN) 200 MG tablet, Take 600 mg by mouth 2 (Two) Times a Day. 3 x week, Disp: , Rfl:   •  omeprazole (priLOSEC) 40 MG capsule, Take 1 capsule by mouth Daily., Disp: 30 capsule, Rfl: 11  •  amoxicillin-clavulanate (Augmentin) 875-125 MG per tablet, Take 1 tablet by mouth 2 (Two) Times a Day., Disp: 14 tablet, Rfl: 0  •  doxycycline (VIBRAMYCIN) 100 MG capsule, Take 1 capsule by mouth 2 (Two) Times a Day., Disp: 14 capsule, Rfl: 0    Review of Systems   Constitutional: Negative.    HENT: Positive for trouble swallowing. Negative for congestion, drooling, ear discharge and ear pain.    Respiratory: Positive for cough and stridor. Negative for chest tightness and shortness of breath.    Cardiovascular: Negative.  Negative for chest pain.   Gastrointestinal: Negative for abdominal pain, blood in stool, constipation, diarrhea and vomiting.   Musculoskeletal: Negative for neck pain.        Vital Signs  Vitals:    10/19/21 1005   BP: 136/84   BP Location: Left arm   Patient Position: Sitting   Cuff Size: Large Adult   Pulse: 104   Temp: 98.4 °F (36.9 °C)   Weight: 88.1 kg (194 lb 3.2 oz)   Height: 177.8 cm (70\")   PainSc:   5   PainLoc: Throat       Physical Exam  Vitals reviewed.   Constitutional:       Appearance: He is well-developed.   HENT:      Head: Normocephalic and atraumatic.      Right Ear: Tympanic membrane and ear canal normal.      Left Ear: Tympanic membrane and ear canal normal.      Mouth/Throat:      Pharynx: Oropharynx is clear. No oropharyngeal exudate or posterior oropharyngeal erythema.   Cardiovascular:      Rate and Rhythm: Normal rate and regular rhythm.      Heart sounds: Normal heart sounds. No murmur heard.      Pulmonary:      Effort: Pulmonary effort is normal.      Breath sounds: Examination of the left-lower field reveals rales. Rales present.   Musculoskeletal:      Cervical back: Normal range of motion. No rigidity.   Lymphadenopathy:      Cervical: No cervical " adenopathy.   Neurological:      Mental Status: He is alert and oriented to person, place, and time.          Procedures    ACE III MINI             Assessment/Plan:    Diagnoses and all orders for this visit:    1. Community acquired pneumonia of left lower lobe of lung (Primary)    2. Acute pharyngitis, unspecified etiology  -     COVID-19 PCR, [facility], NP SWAB IN [facility] V[facility]AL TRANSPORT MEDIA/ORAL SWISH 24-30 HR TAT - Swab, Nasopharynx  -     POCT Strep A, molecular    Other orders  -     Discontinue: amoxicillin-clavulanate (Augmentin) 250-62.5 MG/5ML suspension; Take 15 mL by mouth 2 (Two) Times a Day.  Dispense: 210 mL; Refill: 0  -     Discontinue: doxycycline (VIBRAMYCIN) 50 MG/5ML syrup; Take 10 mL by mouth Every 12 (Twelve) Hours.  Dispense: 140 mL; Refill: 0    Plan    Examination suggests atypical pneumonia.  Will cover with Augmentin and doxycycline.  He will let me know if he is not better in 48 hours.      Plan of care reviewed with patient at the conclusion of today's visit. Education was provided regarding diagnosis, management, and any prescribed or recommended OTC medications.Patient verbalizes understanding of and agreement with management plan.         Bong Veloz MD           Answers for HPI/ROS submitted by the patient on 10/19/2021  What is the primary reason for your visit?: Sore Throat  Chronicity: new  Onset: in the past 7 days  Progression since onset: rapidly worsening  Pain worse on: neither  Fever: 100 - 100.9 F  Fever duration: 3 to 4 days  Pain - numeric: 7/10  headaches: No  hoarse voice: Yes  plugged ear sensation: No  strep: No  mono: No

## 2021-10-19 NOTE — TELEPHONE ENCOUNTER
Caller: Kayode Cline    Relationship: Self    Best call back number: [phone]    What is the best time to reach you: AS SOON AS POSSIBLE     Who are you requesting to speak with (clinical staff, provider,  specific staff member): NURSE OR DOCTOR CECILIA         What was the call regarding: THE PATIENT WAS SEEN TODAY HE STATES THAT  [facility] JUST CALLED  HIM AND TOLD HIM THAT THE LIQUID ANTIBIOTIC THAT THE DOCTOR PRESCRIBED WOULD TAKE SEVERAL DAYS FOR THE INSURANCE TO APPROVE THE MEDICATION. THE PATIENT STATES THAT IF THE LIQUID CANNOT GET APPROVED TODAY HE WOULD LIKE TO KNOW IF A CRUSHABLE TABLET CAN BE CALLED IN     Do you require a callback: YES

## 2021-10-20 LAB — SARS-COV-2 RNA NOSE QL NAA+PROBE: NOT DETECTED

## 2021-12-03 ENCOUNTER — OFFICE VISIT (OUTPATIENT)
Dept: INTERNAL MEDICINE | Facility: CLINIC | Age: 61
End: 2021-12-03

## 2021-12-03 VITALS
HEIGHT: 70 IN | HEART RATE: 92 BPM | WEIGHT: 189 LBS | DIASTOLIC BLOOD PRESSURE: 82 MMHG | BODY MASS INDEX: 27.06 KG/M2 | SYSTOLIC BLOOD PRESSURE: 136 MMHG | TEMPERATURE: 98.2 F

## 2021-12-03 DIAGNOSIS — R05.9 COUGH: Primary | ICD-10-CM

## 2021-12-03 LAB
EXPIRATION DATE: NORMAL
FLUAV RNA RESP QL NAA+PROBE: NEGATIVE
FLUBV RNA RESP QL NAA+PROBE: NEGATIVE
INTERNAL CONTROL: NORMAL
Lab: NORMAL

## 2021-12-03 PROCEDURE — 99213 OFFICE O/P EST LOW 20 MIN: CPT | Performed by: NURSE PRACTITIONER

## 2021-12-03 PROCEDURE — U0004 COV-19 TEST NON-CDC HGH THRU: HCPCS | Performed by: NURSE PRACTITIONER

## 2021-12-03 PROCEDURE — 87502 INFLUENZA DNA AMP PROBE: CPT | Performed by: NURSE PRACTITIONER

## 2021-12-03 RX ORDER — AMOXICILLIN AND CLAVULANATE POTASSIUM 875; 125 MG/1; MG/1
1 TABLET, FILM COATED ORAL 2 TIMES DAILY
Qty: 14 TABLET | Refills: 0 | Status: SHIPPED | OUTPATIENT
Start: 2021-12-03 | End: 2022-06-15

## 2021-12-03 RX ORDER — OMEPRAZOLE 40 MG/1
40 CAPSULE, DELAYED RELEASE ORAL DAILY
Qty: 30 CAPSULE | Refills: 11 | Status: SHIPPED | OUTPATIENT
Start: 2021-12-03 | End: 2023-02-22

## 2021-12-03 NOTE — PROGRESS NOTES
"Kayode Cline  1960  [id number]  Patient Care Team:  Bong Veloz MD as PCP - General    Kayode Cline is a pleasant 61 y.o. male who presents for evaluation of Cough, Sore Throat, and Sinus Problem      Chief Complaint   Patient presents with   • Cough   • Sore Throat   • Sinus Problem       HPI:   Productive cough, some wheezing, sob and thick brown productive sputum.  Sx starting Monday, gradually worse.  Decreased appetite, no fever. Some sweats.  Has tried multiple OTC meds without improvement.  Past Medical History:   Diagnosis Date   • Delgado's esophagus    • Esophageal candidiasis (HCC) 2019   • Inability to swallow 1/15/2018   • Influenza A 1/15/2018     Past Surgical History:   Procedure Laterality Date   • ENDOSCOPY     • HERNIA REPAIR      Infant   • PROSTATE BIOPSY  10/2020    normal   • VASECTOMY       Family History   Problem Relation Age of Onset   • Diabetes Mother          - Age 88   • Coronary artery disease Mother      Social History     Tobacco Use   Smoking Status Never Smoker   Smokeless Tobacco Never Used     No Known Allergies    Current Outpatient Medications:   •  Cetirizine HCl (ZYRTEC ALLERGY) 10 MG capsule, Take 1 tablet by mouth Daily As Needed., Disp: , Rfl:   •  ibuprofen (ADVIL,MOTRIN) 200 MG tablet, Take 600 mg by mouth Every 6 (Six) Hours As Needed. 3 x week , Disp: , Rfl:   •  omeprazole (priLOSEC) 40 MG capsule, Take 1 capsule by mouth Daily., Disp: 30 capsule, Rfl: 11  •  amoxicillin-clavulanate (Augmentin) 875-125 MG per tablet, Take 1 tablet by mouth 2 (Two) Times a Day., Disp: 14 tablet, Rfl: 0    Review of Systems  /82 (BP Location: Left arm, Patient Position: Sitting, Cuff Size: Adult)   Pulse 92   Temp 98.2 °F (36.8 °C) (Temporal)   Ht 177.8 cm (70\")   Wt 85.7 kg (189 lb)   BMI 27.12 kg/m²     Physical Exam  Constitutional:       Appearance: He is well-developed.   HENT:      Head: Normocephalic and atraumatic.      " Comments: *wearing mask  Eyes:      Conjunctiva/sclera: Conjunctivae normal.      Pupils: Pupils are equal, round, and reactive to light.   Cardiovascular:      Rate and Rhythm: Normal rate and regular rhythm.      Pulses: Normal pulses.      Heart sounds: Normal heart sounds.   Pulmonary:      Effort: Pulmonary effort is normal.      Breath sounds: Normal breath sounds.   Musculoskeletal:         General: Normal range of motion.      Cervical back: Normal range of motion and neck supple.   Skin:     General: Skin is warm and dry.   Neurological:      Mental Status: He is alert and oriented to person, place, and time.   Psychiatric:         Mood and Affect: Mood normal.         Behavior: Behavior normal.         Thought Content: Thought content normal.         Judgment: Judgment normal.         Procedures    Results Review:  I reviewed the patient's new clinical results.    PHQ-9 Total Score:      Assessment/Plan:  Diagnoses and all orders for this visit:    1. Cough (Primary)  Comments:  Flu negative, Covid testing done today.  Will start Augmentin, continue OTC meds  Orders:  -     COVID-19 PCR, [facility], NP SWAB IN [facility] VIRAL TRANSPORT MEDIA/ORAL SWISH 24-30 HR TAT - Swab, Nasopharynx  -     POCT Flu A&B, Molecular    Other orders  -     omeprazole (priLOSEC) 40 MG capsule; Take 1 capsule by mouth Daily.  Dispense: 30 capsule; Refill: 11  -     amoxicillin-clavulanate (Augmentin) 875-125 MG per tablet; Take 1 tablet by mouth 2 (Two) Times a Day.  Dispense: 14 tablet; Refill: 0       There are no Patient Instructions on file for this visit.  Plan of care reviewed with patient at the conclusion of today's visit. Education was provided regarding diagnosis, management and any prescribed or recommended OTC medications.  Patient verbalizes understanding of and agreement with management plan.    Return if symptoms worsen or fail to improve.    Dictated Utilizing Dragon Dictation.    QAMAR Zavala

## 2021-12-04 LAB — SARS-COV-2 RNA NOSE QL NAA+PROBE: NOT DETECTED

## 2022-01-20 ENCOUNTER — TELEPHONE (OUTPATIENT)
Dept: GASTROENTEROLOGY | Facility: CLINIC | Age: 62
End: 2022-01-20

## 2022-01-20 NOTE — TELEPHONE ENCOUNTER
The patient called with some questions about a bill he is receiving for pathology taken during his procedure in July of 2020. I reached out to Catherine in the billing department to see if she could give me any insight on this issue. I will call the patient back when I get any information.

## 2022-06-15 ENCOUNTER — OFFICE VISIT (OUTPATIENT)
Dept: INTERNAL MEDICINE | Facility: CLINIC | Age: 62
End: 2022-06-15

## 2022-06-15 ENCOUNTER — LAB (OUTPATIENT)
Dept: LAB | Facility: HOSPITAL | Age: 62
End: 2022-06-15

## 2022-06-15 VITALS
HEART RATE: 82 BPM | HEIGHT: 70 IN | WEIGHT: 190.6 LBS | TEMPERATURE: 98.7 F | DIASTOLIC BLOOD PRESSURE: 84 MMHG | SYSTOLIC BLOOD PRESSURE: 128 MMHG | BODY MASS INDEX: 27.29 KG/M2

## 2022-06-15 DIAGNOSIS — E78.00 HYPERCHOLESTEROLEMIA: ICD-10-CM

## 2022-06-15 DIAGNOSIS — R73.03 PREDIABETES: ICD-10-CM

## 2022-06-15 DIAGNOSIS — Z00.00 ANNUAL PHYSICAL EXAM: Primary | ICD-10-CM

## 2022-06-15 DIAGNOSIS — R97.20 ABNORMAL PSA: ICD-10-CM

## 2022-06-15 DIAGNOSIS — K22.70 BARRETT'S ESOPHAGUS WITHOUT DYSPLASIA: ICD-10-CM

## 2022-06-15 DIAGNOSIS — I10 PRIMARY HYPERTENSION: ICD-10-CM

## 2022-06-15 LAB
ALBUMIN SERPL-MCNC: 4.6 G/DL (ref 3.5–5.2)
ALBUMIN UR-MCNC: 1.7 MG/DL
ALBUMIN/GLOB SERPL: 1.8 G/DL
ALP SERPL-CCNC: 89 U/L (ref 39–117)
ALT SERPL W P-5'-P-CCNC: 20 U/L (ref 1–41)
ANION GAP SERPL CALCULATED.3IONS-SCNC: 11.3 MMOL/L (ref 5–15)
AST SERPL-CCNC: 21 U/L (ref 1–40)
BASOPHILS # BLD AUTO: 0.02 10*3/MM3 (ref 0–0.2)
BASOPHILS NFR BLD AUTO: 0.4 % (ref 0–1.5)
BILIRUB SERPL-MCNC: 0.8 MG/DL (ref 0–1.2)
BUN SERPL-MCNC: 13 MG/DL (ref 8–23)
BUN/CREAT SERPL: 13.7 (ref 7–25)
CALCIUM SPEC-SCNC: 9.4 MG/DL (ref 8.6–10.5)
CHLORIDE SERPL-SCNC: 103 MMOL/L (ref 98–107)
CHOLEST SERPL-MCNC: 196 MG/DL (ref 0–200)
CO2 SERPL-SCNC: 24.7 MMOL/L (ref 22–29)
CREAT SERPL-MCNC: 0.95 MG/DL (ref 0.76–1.27)
CREAT UR-MCNC: 264.6 MG/DL
DEPRECATED RDW RBC AUTO: 41.8 FL (ref 37–54)
EGFRCR SERPLBLD CKD-EPI 2021: 91.6 ML/MIN/1.73
EOSINOPHIL # BLD AUTO: 0.13 10*3/MM3 (ref 0–0.4)
EOSINOPHIL NFR BLD AUTO: 2.6 % (ref 0.3–6.2)
ERYTHROCYTE [DISTWIDTH] IN BLOOD BY AUTOMATED COUNT: 13.3 % (ref 12.3–15.4)
GLOBULIN UR ELPH-MCNC: 2.5 GM/DL
GLUCOSE SERPL-MCNC: 95 MG/DL (ref 65–99)
HBA1C MFR BLD: 6 % (ref 4.8–5.6)
HCT VFR BLD AUTO: 44.5 % (ref 37.5–51)
HDLC SERPL-MCNC: 41 MG/DL (ref 40–60)
HGB BLD-MCNC: 14.8 G/DL (ref 13–17.7)
IMM GRANULOCYTES # BLD AUTO: 0.01 10*3/MM3 (ref 0–0.05)
IMM GRANULOCYTES NFR BLD AUTO: 0.2 % (ref 0–0.5)
LDLC SERPL CALC-MCNC: 135 MG/DL (ref 0–100)
LDLC/HDLC SERPL: 3.25 {RATIO}
LYMPHOCYTES # BLD AUTO: 1.55 10*3/MM3 (ref 0.7–3.1)
LYMPHOCYTES NFR BLD AUTO: 31.5 % (ref 19.6–45.3)
MCH RBC QN AUTO: 29.2 PG (ref 26.6–33)
MCHC RBC AUTO-ENTMCNC: 33.3 G/DL (ref 31.5–35.7)
MCV RBC AUTO: 87.8 FL (ref 79–97)
MICROALBUMIN/CREAT UR: 6.4 MG/G
MONOCYTES # BLD AUTO: 0.47 10*3/MM3 (ref 0.1–0.9)
MONOCYTES NFR BLD AUTO: 9.6 % (ref 5–12)
NEUTROPHILS NFR BLD AUTO: 2.74 10*3/MM3 (ref 1.7–7)
NEUTROPHILS NFR BLD AUTO: 55.7 % (ref 42.7–76)
NRBC BLD AUTO-RTO: 0 /100 WBC (ref 0–0.2)
PLATELET # BLD AUTO: 318 10*3/MM3 (ref 140–450)
PMV BLD AUTO: 10.2 FL (ref 6–12)
POTASSIUM SERPL-SCNC: 3.9 MMOL/L (ref 3.5–5.2)
PROT SERPL-MCNC: 7.1 G/DL (ref 6–8.5)
PSA SERPL-MCNC: 3.35 NG/ML (ref 0–4)
RBC # BLD AUTO: 5.07 10*6/MM3 (ref 4.14–5.8)
SODIUM SERPL-SCNC: 139 MMOL/L (ref 136–145)
TRIGL SERPL-MCNC: 108 MG/DL (ref 0–150)
VLDLC SERPL-MCNC: 20 MG/DL (ref 5–40)
WBC NRBC COR # BLD: 4.92 10*3/MM3 (ref 3.4–10.8)

## 2022-06-15 PROCEDURE — 84153 ASSAY OF PSA TOTAL: CPT

## 2022-06-15 PROCEDURE — 99396 PREV VISIT EST AGE 40-64: CPT | Performed by: INTERNAL MEDICINE

## 2022-06-15 PROCEDURE — 85025 COMPLETE CBC W/AUTO DIFF WBC: CPT

## 2022-06-15 PROCEDURE — 83036 HEMOGLOBIN GLYCOSYLATED A1C: CPT

## 2022-06-15 PROCEDURE — 80061 LIPID PANEL: CPT

## 2022-06-15 PROCEDURE — 80053 COMPREHEN METABOLIC PANEL: CPT

## 2022-06-15 PROCEDURE — 82043 UR ALBUMIN QUANTITATIVE: CPT

## 2022-06-15 PROCEDURE — 82570 ASSAY OF URINE CREATININE: CPT

## 2022-06-15 NOTE — PROGRESS NOTES
[facility]     Kayode Cline  1960   [id number]      Patient Care Team:  Bong Veloz MD as PCP - General    Chief Complaint::   Chief Complaint   Patient presents with   • Annual Exam        HPI      The patient has consented to being recorded using IVETH.    The patient presents today for annual examination and for a follow-up of hypertension, prediabetes, hyperlipidemia and Delgado's esophagus.    Fatigue  The patient states that he feels tired a lot, but he can get out and do any activities. He states that he is only tired when he sits down. He states that he is not very active in the winter, but he is taking care of 2 yards. He states that he still does disc golf. He states that he does not get to go often because his work is busy. He states that he went to [employer] and he has a disc golf course there and it is a long course. He states that there is a lot of walking. He states that he did 2.5 rounds and the only issue he had was that night he had some major leg cramps. He states that his big muscle locked up on him. He states that he drank a lot. He states that his wife is obsessed with her Fitbit. He states that his phone tells him how many steps if it is in his pocket. He states that he did 16,000 steps at a hour.    Delgado's esophagus  The patient states that he is not sure when his next EGD is. He states that he was told that there was no signs of Delgado's at all. He states that he wanted to check it again. He states that it was going to be in 5 to 7 years. He states that the prostate biopsy is very uncomfortable. He states that he was told with his insurance, his co-pay for the biopsy was going to be over 8000 dollars. He states that he told him that he could not afford it. He states that he works with financial assistance and his co-pay is now 5 dollars.    Health maintenance  The patient is due for a tetanus vaccine.    Chronic Conditions:      Patient Active Problem List    Diagnosis   • Delgado esophagus   • GERD (gastroesophageal reflux disease)   • Primary hypertension   • Annual physical exam   • Prediabetes   • Non-seasonal allergic rhinitis due to pollen   • Induratio penis plastica   • Hypercholesterolemia   • Abnormal PSA        Past Medical History:   Diagnosis Date   • Delgado's esophagus    • Esophageal candidiasis (HCC) 2019   • Inability to swallow 1/15/2018   • Influenza A 1/15/2018       Past Surgical History:   Procedure Laterality Date   • ENDOSCOPY     • HERNIA REPAIR      Infant   • PROSTATE BIOPSY  10/2020    normal   • VASECTOMY         Family History   Problem Relation Age of Onset   • Diabetes Mother          - Age 88   • Coronary artery disease Mother        Social History     Socioeconomic History   • Marital status:    Tobacco Use   • Smoking status: Never Smoker   • Smokeless tobacco: Never Used   Substance and Sexual Activity   • Alcohol use: No   • Drug use: No   • Sexual activity: Yes     Partners: Female     Birth control/protection: Post-menopausal, Surgical       No Known Allergies      Current Outpatient Medications:   •  Cetirizine HCl 10 MG capsule, Take 1 tablet by mouth Daily As Needed., Disp: , Rfl:   •  ibuprofen (ADVIL,MOTRIN) 200 MG tablet, Take 600 mg by mouth Every 6 (Six) Hours As Needed. 3 x week, Disp: , Rfl:   •  omeprazole (priLOSEC) 40 MG capsule, Take 1 capsule by mouth Daily., Disp: 30 capsule, Rfl: 11    Immunization History   Administered Date(s) Administered   • COVID-19 (PFIZER) PURPLE CAP 2021, 2021, 2021   • Flu Vaccine Quad PF >36MO 2018, 09/15/2020   • FluLaval/Fluarix/Fluzone >6 2019, 09/15/2020   • Fluzone Split Quad (Multi-dose) 2016, 2016, 10/26/2017, 2019, 2021   • Hepatitis A 2018, 2019   • Shingrix 2021, 2021        Health Maintenance Due   Topic Date Due   • TDAP/TD VACCINES (1 - Tdap) Never done   • COVID-19  "Vaccine (4 - Booster for Pfizer series) 03/29/2022   • LIPID PANEL  06/03/2022   • ANNUAL PHYSICAL  06/04/2022        Review of Systems   Constitutional: Negative for chills, fatigue and fever.   HENT: Negative for congestion, ear pain, hearing loss and sinus pressure.    Respiratory: Negative for cough, chest tightness, shortness of breath and wheezing.    Cardiovascular: Negative for chest pain and palpitations.   Gastrointestinal: Negative for abdominal pain, blood in stool and constipation.   Skin: Negative for color change.   Allergic/Immunologic: Negative for environmental allergies.   Neurological: Negative for dizziness, speech difficulty and headache.   Psychiatric/Behavioral: Negative for decreased concentration. The patient is not nervous/anxious.         Vital Signs  Vitals:    06/15/22 0812   BP: 128/84   BP Location: Left arm   Patient Position: Sitting   Cuff Size: Large Adult   Pulse: 82   Temp: 98.7 °F (37.1 °C)   Weight: 86.5 kg (190 lb 9.6 oz)   Height: 176.5 cm (69.5\")   PainSc: 0-No pain       Physical Exam  Vitals and nursing note reviewed.   Constitutional:       Appearance: He is well-developed.   HENT:      Head: Normocephalic and atraumatic.      Right Ear: External ear normal.      Left Ear: External ear normal.      Nose: Nose normal.      Mouth/Throat:      Pharynx: No oropharyngeal exudate.   Eyes:      Conjunctiva/sclera: Conjunctivae normal.      Pupils: Pupils are equal, round, and reactive to light.   Neck:      Thyroid: No thyromegaly.      Vascular: No JVD.   Cardiovascular:      Rate and Rhythm: Normal rate and regular rhythm.      Heart sounds: Normal heart sounds. No murmur heard.    No friction rub. No gallop.   Pulmonary:      Effort: Pulmonary effort is normal. No respiratory distress.      Breath sounds: Normal breath sounds. No wheezing or rales.   Chest:      Chest wall: No tenderness.   Abdominal:      General: Bowel sounds are normal. There is no distension.      " Palpations: Abdomen is soft. There is no mass.      Tenderness: There is no abdominal tenderness. There is no guarding or rebound.      Hernia: No hernia is present.   Musculoskeletal:         General: No tenderness. Normal range of motion.      Cervical back: Normal range of motion and neck supple.   Lymphadenopathy:      Cervical: No cervical adenopathy.   Skin:     General: Skin is warm and dry.      Findings: No erythema or rash.   Neurological:      Mental Status: He is alert and oriented to person, place, and time.      Cranial Nerves: No cranial nerve deficit.      Sensory: No sensory deficit.      Motor: No abnormal muscle tone.      Coordination: Coordination normal.      Deep Tendon Reflexes: Reflexes normal.   Psychiatric:         Behavior: Behavior normal.         Thought Content: Thought content normal.         Judgment: Judgment normal.          Procedures     Fall Risk Screen:  STEADI Fall Risk Assessment has not been completed.    Health Habits and Functional and Cognitive Screening:  No flowsheet data found.    Depression Sreening  PHQ-9 Total Score: 0     ACE III MINI             Assessment/Plan:    Diagnoses and all orders for this visit:    1. Annual physical exam (Primary)    2. Primary hypertension  -     CBC & Differential; Future  -     Microalbumin / Creatinine Urine Ratio - Urine, Clean Catch; Future    3. Prediabetes  -     Hemoglobin A1c; Future    4. 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 Hypercholesterolemia  -     Comprehensive Metabolic Panel; Future  -     Lipid Panel; Future    6. Abnormal PSA  -     PSA DIAGNOSTIC; Future        1. Prevention  - Overall, he remains in good health with good lifestyle habits. Colonoscopy is due in 2028. He is fully vaccinated against COVID-19.    2. Hypertension  - Blood pressure remains well controlled on nonpharmacologic therapy.    3. Prediabetes  - A1c is pending. The treatment remains healthy diet and avoidance of weight gain.    4.  Delgado's esophagus  - Last EGD showed no evidence of Delgado's. He is due to follow up with GI at 5 to 7 years after the last EGD and he will continue omeprazole.    5. Hyperlipidemia  - Lipid panel is pending. The treatment remains healthy and avoidance of weight gain.    6. Abnormal PSA  - Two years ago, he had a negative prostate biopsy and PSA last year was under 4 ng/dL. We will continue to monitor and unless there is a consistent upward trend, no further work-up will be needed.    Follow up in 1 year.    Labs  Results for orders placed or performed in visit on 12/03/21   COVID-19 PCR, [email or web address], NP SWAB IN LEX[facility] VIRAL TRANSPORT MEDIA/ORAL SWISH 24-30 HR TAT - Swab, Nasopharynx    Specimen: Nasopharynx; Swab   Result Value Ref Range    SARS-CoV-2 BRYCE Not Detected Not Detected   POCT Flu A&B, Molecular    Specimen: Swab   Result Value Ref Range    POC Influenza A, Molecular Negative Negative    POC Influenza B, Molecular Negative Negative    Internal Control Passed Passed    Lot Number [id number]     Expiration Date 10/08/2022         Counseling was given to patient for the following topics: appropriate exercise as he is doing, disease prevention and healthy eating habits.    Plan of care reviewed with patient at the conclusion of today's visit. Education was provided regarding diagnosis, management, and any prescribed or recommended OTC medications.Patient verbalizes understanding of and agreement with management plan.      BMI is >= 25 and <30. (Overweight) The following options were offered after discussion;: exercise counseling/recommendations and nutrition counseling/recommendations         Transcribed from ambient dictation for Bong Veloz MD by FÉLIX HUTTON.  06/15/22   09:48 EDT    Patient verbalized consent to the visit recording.

## 2022-08-04 RX ORDER — PREDNISONE 10 MG/1
20 TABLET ORAL DAILY
Qty: 14 TABLET | Refills: 0 | Status: SHIPPED | OUTPATIENT
Start: 2022-08-04 | End: 2022-12-14

## 2022-12-14 ENCOUNTER — OFFICE VISIT (OUTPATIENT)
Dept: INTERNAL MEDICINE | Facility: CLINIC | Age: 62
End: 2022-12-14

## 2022-12-14 VITALS
HEIGHT: 69 IN | BODY MASS INDEX: 28.91 KG/M2 | WEIGHT: 195.2 LBS | TEMPERATURE: 97.5 F | HEART RATE: 76 BPM | DIASTOLIC BLOOD PRESSURE: 88 MMHG | SYSTOLIC BLOOD PRESSURE: 130 MMHG

## 2022-12-14 DIAGNOSIS — J02.9 SORE THROAT: ICD-10-CM

## 2022-12-14 DIAGNOSIS — J98.8 RESPIRATORY INFECTION: Primary | ICD-10-CM

## 2022-12-14 LAB
EXPIRATION DATE: NORMAL
EXPIRATION DATE: NORMAL
FLUAV AG UPPER RESP QL IA.RAPID: NOT DETECTED
FLUBV AG UPPER RESP QL IA.RAPID: NOT DETECTED
INTERNAL CONTROL: NORMAL
INTERNAL CONTROL: NORMAL
Lab: NORMAL
Lab: NORMAL
S PYO AG THROAT QL: NEGATIVE
SARS-COV-2 AG UPPER RESP QL IA.RAPID: NOT DETECTED

## 2022-12-14 PROCEDURE — 99214 OFFICE O/P EST MOD 30 MIN: CPT | Performed by: STUDENT IN AN ORGANIZED HEALTH CARE EDUCATION/TRAINING PROGRAM

## 2022-12-14 PROCEDURE — 87880 STREP A ASSAY W/OPTIC: CPT | Performed by: STUDENT IN AN ORGANIZED HEALTH CARE EDUCATION/TRAINING PROGRAM

## 2022-12-14 PROCEDURE — 87428 SARSCOV & INF VIR A&B AG IA: CPT | Performed by: STUDENT IN AN ORGANIZED HEALTH CARE EDUCATION/TRAINING PROGRAM

## 2022-12-14 RX ORDER — AZITHROMYCIN 250 MG/1
TABLET, FILM COATED ORAL
Qty: 6 TABLET | Refills: 0 | Status: SHIPPED | OUTPATIENT
Start: 2022-12-14

## 2022-12-14 NOTE — PROGRESS NOTES
"Chief Complaint  Kayode Cline is a 62 y.o. male presenting for Cough (Green mucous  , Chills, fever , fatigue, body ache, headache , post nasal drip , ST ,   X's 2 days ).     Patient has a past medical history of hypertension, hyperlipidemia, prediabetes, GERD, Delgado's esophagus, elevated PSA and allergic rhinitis.    History of Present Illness  Patient is here due to concern for infection.    This started 2-3 days ago.  Initially tickle in the throat, worse yesterday with fever 101 F, chills and an episode of rigors lasting about 10 minutes.  Also mild postnasal drainage, that he has chronically, but no significant nasal congestion or runny nose.  He is reporting diffusely sore throat, also some cough with green phlegm.  No shortness of breath, no wheezing.  Fatigue.  Body aches.  Of note he had COVID in August.  He reports having history of 2 hospitalizations in setting of difficulty swallowing due to severe sore throat.    The following portions of the patient's history were reviewed and updated as appropriate: allergies, current medications, past family history, past medical history, past social history, past surgical history and problem list.    Objective  /88 (BP Location: Left arm, Patient Position: Sitting, Cuff Size: Adult)   Pulse 76   Temp 97.5 °F (36.4 °C) (Temporal)   Ht 176.5 cm (69.49\")   Wt 88.5 kg (195 lb 3.2 oz)   BMI 28.42 kg/m²     Physical Exam  Vitals reviewed.   Constitutional:       General: He is not in acute distress.     Appearance: Normal appearance. He is ill-appearing. He is not toxic-appearing or diaphoretic.   HENT:      Head: Normocephalic and atraumatic.      Right Ear: Tympanic membrane, ear canal and external ear normal. There is no impacted cerumen.      Left Ear: Tympanic membrane, ear canal and external ear normal. There is no impacted cerumen.      Nose: No congestion.      Mouth/Throat:      Mouth: Mucous membranes are moist.      Pharynx: Posterior " oropharyngeal erythema present. No oropharyngeal exudate.      Comments: The pharynx is diffusely red, no tonsillar hypertrophy  Eyes:      Extraocular Movements: Extraocular movements intact.      Conjunctiva/sclera: Conjunctivae normal.   Cardiovascular:      Rate and Rhythm: Normal rate and regular rhythm.      Heart sounds: Normal heart sounds. No murmur heard.  Pulmonary:      Effort: Pulmonary effort is normal.      Breath sounds: Normal breath sounds.   Musculoskeletal:      Cervical back: Neck supple. No tenderness.   Lymphadenopathy:      Cervical: No cervical adenopathy.   Skin:     General: Skin is warm and dry.   Neurological:      Mental Status: He is alert and oriented to person, place, and time. Mental status is at baseline.   Psychiatric:         Behavior: Behavior normal.         Thought Content: Thought content normal.         Assessment/Plan   1. Respiratory infection  COVID and flu test negative.  Strep test negative.  Patient is ill, but nontoxic-appearing.  He has systemic symptoms in form of rigors and fever.  Most likely this is a viral infection, however given his history of hospital admission 2 times I am little bit concerned if his symptoms get worse.  I prescribed him treatment for That he can start if he gets worse over the next couple of days or if there is no improvement.  With any severe symptoms he should get back in touch right away.  - POCT SARS-CoV-2 Antigen DINO  - azithromycin (Zithromax Z-Jayesh) 250 MG tablet; Take 2 tablets the first day, then 1 tablet daily for 4 days.  Dispense: 6 tablet; Refill: 0    2. Sore throat  Counseled on taking over-the-counter ibuprofen 600 mg every 6 hours as needed, he can also add Tylenol if needed  - POC Rapid Strep A      No follow-ups on file.    Michael Munson MD  Family Medicine  12/14/2022

## 2023-02-22 RX ORDER — OMEPRAZOLE 40 MG/1
40 CAPSULE, DELAYED RELEASE ORAL DAILY
Qty: 30 CAPSULE | Refills: 5 | Status: SHIPPED | OUTPATIENT
Start: 2023-02-22

## 2023-02-22 RX ORDER — OMEPRAZOLE 40 MG/1
CAPSULE, DELAYED RELEASE ORAL
Qty: 30 CAPSULE | Refills: 0 | Status: SHIPPED | OUTPATIENT
Start: 2023-02-22 | End: 2023-02-22 | Stop reason: SDUPTHER

## 2023-02-22 NOTE — TELEPHONE ENCOUNTER
Caller: Kayode Cline    Relationship: Self    Best call back number:[phone]    Requested Prescriptions:   Requested Prescriptions     Pending Prescriptions Disp Refills   • omeprazole (priLOSEC) 40 MG capsule 30 capsule 0     Sig: Take 1 capsule by mouth Daily.        Pharmacy where request should be sent: [id number] - [place], [place] - [address] - [phone]  - [phone] FX     Additional details provided by patient: PATIENT STATED THE PRESCRIPTION WAS SENT YESTERDAY BUT IT WAS ONLY FOR 30 DAYS HE IS REQUESTING A NEW PRESCRIPTION SENT FOR 90 DAYS WITH 4 REFILLS BEFORE HE PICKS IT UP    Does the patient have less than a 3 day supply:  [] Yes  [x] No    Would you like a call back once the refill request has been completed: [] Yes [x] No    If the office needs to give you a call back, can they leave a voicemail: [] Yes [x] No    Naina Martins Rep   02/22/23 12:40 EST

## 2023-02-22 NOTE — TELEPHONE ENCOUNTER
Rx Refill Note  Requested Prescriptions     Pending Prescriptions Disp Refills   • omeprazole (priLOSEC) 40 MG capsule 30 capsule 0     Sig: Take 1 capsule by mouth Daily.      Last office visit with prescribing clinician: 6/15/2022   Last telemedicine visit with prescribing clinician: 6/16/2023   Next office visit with prescribing clinician: 6/16/2023                         Would you like a call back once the refill request has been completed: [] Yes [] No    If the office needs to give you a call back, can they leave a voicemail: [] Yes [] No    Richard Rogers MA  02/22/23, 12:53 EST

## 2023-06-16 ENCOUNTER — LAB (OUTPATIENT)
Dept: LAB | Facility: HOSPITAL | Age: 63
End: 2023-06-16
Payer: COMMERCIAL

## 2023-06-16 ENCOUNTER — OFFICE VISIT (OUTPATIENT)
Dept: INTERNAL MEDICINE | Facility: CLINIC | Age: 63
End: 2023-06-16
Payer: COMMERCIAL

## 2023-06-16 VITALS
WEIGHT: 185.4 LBS | HEART RATE: 72 BPM | TEMPERATURE: 98 F | SYSTOLIC BLOOD PRESSURE: 124 MMHG | DIASTOLIC BLOOD PRESSURE: 76 MMHG | HEIGHT: 70 IN | BODY MASS INDEX: 26.54 KG/M2

## 2023-06-16 DIAGNOSIS — Z12.5 PROSTATE CANCER SCREENING: ICD-10-CM

## 2023-06-16 DIAGNOSIS — K22.70 BARRETT'S ESOPHAGUS WITHOUT DYSPLASIA: ICD-10-CM

## 2023-06-16 DIAGNOSIS — I10 PRIMARY HYPERTENSION: ICD-10-CM

## 2023-06-16 DIAGNOSIS — R73.03 PREDIABETES: ICD-10-CM

## 2023-06-16 DIAGNOSIS — Z00.00 PREVENTATIVE HEALTH CARE: Primary | ICD-10-CM

## 2023-06-16 DIAGNOSIS — L57.0 ACTINIC KERATOSES: ICD-10-CM

## 2023-06-16 DIAGNOSIS — E78.00 HYPERCHOLESTEROLEMIA: ICD-10-CM

## 2023-06-16 DIAGNOSIS — K21.9 GASTROESOPHAGEAL REFLUX DISEASE WITHOUT ESOPHAGITIS: ICD-10-CM

## 2023-06-16 LAB
ALBUMIN SERPL-MCNC: 4.7 G/DL (ref 3.5–5.2)
ALBUMIN UR-MCNC: <1.2 MG/DL
ALBUMIN/GLOB SERPL: 1.7 G/DL
ALP SERPL-CCNC: 80 U/L (ref 39–117)
ALT SERPL W P-5'-P-CCNC: 18 U/L (ref 1–41)
ANION GAP SERPL CALCULATED.3IONS-SCNC: 11.4 MMOL/L (ref 5–15)
AST SERPL-CCNC: 23 U/L (ref 1–40)
BASOPHILS # BLD AUTO: 0.03 10*3/MM3 (ref 0–0.2)
BASOPHILS NFR BLD AUTO: 0.6 % (ref 0–1.5)
BILIRUB SERPL-MCNC: 0.8 MG/DL (ref 0–1.2)
BUN SERPL-MCNC: 14 MG/DL (ref 8–23)
BUN/CREAT SERPL: 14.9 (ref 7–25)
CALCIUM SPEC-SCNC: 9.8 MG/DL (ref 8.6–10.5)
CHLORIDE SERPL-SCNC: 104 MMOL/L (ref 98–107)
CHOLEST SERPL-MCNC: 217 MG/DL (ref 0–200)
CO2 SERPL-SCNC: 25.6 MMOL/L (ref 22–29)
CREAT SERPL-MCNC: 0.94 MG/DL (ref 0.76–1.27)
CREAT UR-MCNC: 199.7 MG/DL
DEPRECATED RDW RBC AUTO: 40.4 FL (ref 37–54)
EGFRCR SERPLBLD CKD-EPI 2021: 92.2 ML/MIN/1.73
EOSINOPHIL # BLD AUTO: 0.14 10*3/MM3 (ref 0–0.4)
EOSINOPHIL NFR BLD AUTO: 2.9 % (ref 0.3–6.2)
ERYTHROCYTE [DISTWIDTH] IN BLOOD BY AUTOMATED COUNT: 13 % (ref 12.3–15.4)
GLOBULIN UR ELPH-MCNC: 2.7 GM/DL
GLUCOSE SERPL-MCNC: 104 MG/DL (ref 65–99)
HBA1C MFR BLD: 6 % (ref 4.8–5.6)
HCT VFR BLD AUTO: 41.6 % (ref 37.5–51)
HDLC SERPL-MCNC: 47 MG/DL (ref 40–60)
HGB BLD-MCNC: 14.3 G/DL (ref 13–17.7)
IMM GRANULOCYTES # BLD AUTO: 0.01 10*3/MM3 (ref 0–0.05)
IMM GRANULOCYTES NFR BLD AUTO: 0.2 % (ref 0–0.5)
LDLC SERPL CALC-MCNC: 156 MG/DL (ref 0–100)
LDLC/HDLC SERPL: 3.29 {RATIO}
LYMPHOCYTES # BLD AUTO: 1.61 10*3/MM3 (ref 0.7–3.1)
LYMPHOCYTES NFR BLD AUTO: 33.3 % (ref 19.6–45.3)
MCH RBC QN AUTO: 29.2 PG (ref 26.6–33)
MCHC RBC AUTO-ENTMCNC: 34.4 G/DL (ref 31.5–35.7)
MCV RBC AUTO: 84.9 FL (ref 79–97)
MICROALBUMIN/CREAT UR: NORMAL MG/G{CREAT}
MONOCYTES # BLD AUTO: 0.48 10*3/MM3 (ref 0.1–0.9)
MONOCYTES NFR BLD AUTO: 9.9 % (ref 5–12)
NEUTROPHILS NFR BLD AUTO: 2.57 10*3/MM3 (ref 1.7–7)
NEUTROPHILS NFR BLD AUTO: 53.1 % (ref 42.7–76)
NRBC BLD AUTO-RTO: 0 /100 WBC (ref 0–0.2)
PLATELET # BLD AUTO: 316 10*3/MM3 (ref 140–450)
PMV BLD AUTO: 10.3 FL (ref 6–12)
POTASSIUM SERPL-SCNC: 3.9 MMOL/L (ref 3.5–5.2)
PROT SERPL-MCNC: 7.4 G/DL (ref 6–8.5)
PSA SERPL-MCNC: 4.81 NG/ML (ref 0–4)
RBC # BLD AUTO: 4.9 10*6/MM3 (ref 4.14–5.8)
SODIUM SERPL-SCNC: 141 MMOL/L (ref 136–145)
TRIGL SERPL-MCNC: 78 MG/DL (ref 0–150)
VLDLC SERPL-MCNC: 14 MG/DL (ref 5–40)
WBC NRBC COR # BLD: 4.84 10*3/MM3 (ref 3.4–10.8)

## 2023-06-16 PROCEDURE — 36415 COLL VENOUS BLD VENIPUNCTURE: CPT

## 2023-06-16 PROCEDURE — 80061 LIPID PANEL: CPT

## 2023-06-16 PROCEDURE — 82043 UR ALBUMIN QUANTITATIVE: CPT

## 2023-06-16 PROCEDURE — G0103 PSA SCREENING: HCPCS

## 2023-06-16 PROCEDURE — 85025 COMPLETE CBC W/AUTO DIFF WBC: CPT

## 2023-06-16 PROCEDURE — 82172 ASSAY OF APOLIPOPROTEIN: CPT

## 2023-06-16 PROCEDURE — 83036 HEMOGLOBIN GLYCOSYLATED A1C: CPT

## 2023-06-16 PROCEDURE — 82570 ASSAY OF URINE CREATININE: CPT

## 2023-06-16 PROCEDURE — 80053 COMPREHEN METABOLIC PANEL: CPT

## 2023-06-16 RX ORDER — OMEPRAZOLE 40 MG/1
40 CAPSULE, DELAYED RELEASE ORAL DAILY
Qty: 90 CAPSULE | Refills: 3 | Status: SHIPPED | OUTPATIENT
Start: 2023-06-16

## 2023-06-16 RX ORDER — CYCLOBENZAPRINE HCL 10 MG
10 TABLET ORAL 3 TIMES DAILY PRN
Qty: 60 TABLET | Refills: 5 | Status: SHIPPED | OUTPATIENT
Start: 2023-06-16

## 2023-06-16 NOTE — PROGRESS NOTES
[facility]     Kayode Cline  1960   [id number]      Patient Care Team:  Bong Veloz MD as PCP - General    Chief Complaint::   Chief Complaint   Patient presents with   • Annual Exam        HPI  The patient comes in for annual examination and for follow-up of hypertension, hyperlipidemia, prediabetes, and GERD with Bar Delgado's esophagus.    Health maintenance  The patient is doing well physically. He denies exercising at the gym; however, he does activities in the yard. He admits to cutting back on soft drinks and junk food and dropped down to 179 pounds. He denies any shortness of breath, chest pain, urinary or bowel issues.     Peyronies  The patient admits to having Peyronies disease. His erections have become softer.     Chronic Conditions:      Patient Active Problem List   Diagnosis   • Delgado esophagus   • GERD (gastroesophageal reflux disease)   • Primary hypertension   • Annual physical exam   • Prediabetes   • Non-seasonal allergic rhinitis due to pollen   • Induratio penis plastica   • Hypercholesterolemia   • Abnormal PSA        Past Medical History:   Diagnosis Date   • Delgado's esophagus    • Esophageal candidiasis 2019   • Inability to swallow 1/15/2018   • Influenza A 1/15/2018       Past Surgical History:   Procedure Laterality Date   • ENDOSCOPY     • HERNIA REPAIR      Infant   • PROSTATE BIOPSY  10/2020    normal   • VASECTOMY         Family History   Problem Relation Age of Onset   • Diabetes Mother          - Age 88   • Coronary artery disease Mother        Social History     Socioeconomic History   • Marital status:    Tobacco Use   • Smoking status: Never   • Smokeless tobacco: Never   Substance and Sexual Activity   • Alcohol use: No   • Drug use: No   • Sexual activity: Yes     Partners: Female     Birth control/protection: Surgical, Post-menopausal       No Known Allergies      Current Outpatient Medications:   •   Cetirizine HCl 10 MG capsule, Take 1 tablet by mouth Daily As Needed., Disp: , Rfl:   •  ibuprofen (ADVIL,MOTRIN) 200 MG tablet, Take 3 tablets by mouth Every 6 (Six) Hours As Needed. 3 x week, Disp: , Rfl:   •  omeprazole (priLOSEC) 40 MG capsule, Take 1 capsule by mouth Daily., Disp: 90 capsule, Rfl: 3  •  cyclobenzaprine (FLEXERIL) 10 MG tablet, Take 1 tablet by mouth 3 (Three) Times a Day As Needed for Muscle Spasms., Disp: 60 tablet, Rfl: 5    Immunization History   Administered Date(s) Administered   • COVID-19 (PFIZER) BIVALENT 12+YRS 11/14/2022   • COVID-19 (PFIZER) Purple Cap Monovalent 03/01/2021, 03/29/2021, 11/29/2021   • Flu Vaccine Quad PF >36MO 08/25/2018, 09/15/2020   • FluLaval/Fluzone >6mos 09/09/2019, 09/15/2020   • Fluzone Quad >6mos (Multi-dose) 02/29/2016, 09/16/2016, 10/26/2017, 09/02/2019, 11/29/2021   • Hepatitis A 08/25/2018, 03/25/2019   • Influenza Injectable Mdck Pf Quad 11/14/2022   • Influenza, Unspecified 11/14/2022   • Shingrix 06/04/2021, 11/08/2021        Health Maintenance Due   Topic Date Due   • TDAP/TD VACCINES (1 - Tdap) Never done   • ANNUAL PHYSICAL  06/15/2023        Review of Systems   Constitutional: Negative for chills, fatigue and fever.   HENT: Negative for congestion, ear pain and sinus pressure.    Respiratory: Negative for cough, chest tightness, shortness of breath and wheezing.    Cardiovascular: Negative for chest pain and palpitations.   Gastrointestinal: Negative for abdominal pain, blood in stool and constipation.   Skin: Negative for color change.   Allergic/Immunologic: Negative for environmental allergies.   Neurological: Negative for dizziness, speech difficulty and headache.   Psychiatric/Behavioral: Negative for decreased concentration. The patient is not nervous/anxious.         Vital Signs  Vitals:    06/16/23 0759   BP: 124/76   BP Location: Left arm   Patient Position: Sitting   Cuff Size: Adult   Pulse: 72   Temp: 98 °F (36.7 °C)   Weight: 84.1 kg  "(185 lb 6.4 oz)   Height: 177 cm (69.69\")   PainSc: 0-No pain       Physical Exam  Vitals and nursing note reviewed.   Constitutional:       General: He is not in acute distress.     Appearance: He is well-developed. He is not diaphoretic.      Comments: NAD. A&Ox3.   HENT:      Head: Normocephalic and atraumatic.      Right Ear: External ear normal.      Left Ear: External ear normal.      Mouth/Throat:      Pharynx: No oropharyngeal exudate.   Eyes:      General: No scleral icterus.     Conjunctiva/sclera: Conjunctivae normal.      Pupils: Pupils are equal, round, and reactive to light.      Comments:  3 raised, slightly pigmented lesions below the left eye consistent with actinic keratoses. Otherwise, he has scattered seborrheic keratoses. No other suspicious lesions.   Neck:      Thyroid: No thyromegaly.      Vascular: No carotid bruit.   Cardiovascular:      Rate and Rhythm: Normal rate and regular rhythm.      Heart sounds: Normal heart sounds, S1 normal and S2 normal. No murmur heard.    No friction rub. No gallop. No S3 or S4 sounds.      Comments: No thrills. Pulses 2+/4 B/L in UE and LE.   Pulmonary:      Effort: Pulmonary effort is normal. No respiratory distress.      Breath sounds: Normal breath sounds. No wheezing, rhonchi or rales.   Chest:      Chest wall: No tenderness.   Abdominal:      General: Bowel sounds are normal. There is no distension or abdominal bruit.      Palpations: Abdomen is soft. There is no mass.      Tenderness: There is no abdominal tenderness.      Hernia: No hernia is present.      Comments: Nondistended, nontender. (+)BSx4.    Musculoskeletal:         General: No deformity. Normal range of motion.      Cervical back: Full passive range of motion without pain and neck supple.      Comments: FROMx4, equal B/L.    Lymphadenopathy:      Cervical: No cervical adenopathy.   Skin:     General: Skin is warm and dry.      Findings: No erythema or rash.   Neurological:      Mental " Status: He is alert and oriented to person, place, and time.      Cranial Nerves: No cranial nerve deficit.      Sensory: No sensory deficit.      Motor: No tremor, atrophy or abnormal muscle tone.      Coordination: Coordination normal.      Gait: Gait normal.      Deep Tendon Reflexes: Reflexes are normal and symmetric.   Psychiatric:         Behavior: Behavior normal.         Thought Content: Thought content normal. Thought content does not include homicidal or suicidal ideation. Thought content does not include homicidal or suicidal plan.         Judgment: Judgment normal.          Cryotherapy, Skin Lesion    Date/Time: 6/16/2023 8:53 AM  Performed by: Bong Veloz MD  Authorized by: Bong Veloz MD   Local anesthesia used: no    Anesthesia:  Local anesthesia used: no    Sedation:  Patient sedated: no    Patient tolerance: patient tolerated the procedure well with no immediate complications  Comments: 3 actinic keratoses located below the left eye treated with cryotherapy.            Fall Risk Screen:  MARYJANE Fall Risk Assessment has not been completed.    Health Habits and Functional and Cognitive Screening:       No data to display                Depression [place]  PHQ-9 Total Score: 0     ACE III MINI             Assessment/Plan:    Diagnoses and all orders for this visit:    1. Preventative health care (Primary)    2. Primary hypertension  -     CBC & Differential; Future  -     Microalbumin / Creatinine Urine Ratio - Urine, Clean Catch; Future    3. Hypercholesterolemia  -     Apolipoprotein B; Future  -     Comprehensive Metabolic Panel; Future  -     Lipid Panel; Future    4. Prediabetes  -     Hemoglobin A1c; Future    5. Delgado's esophagus without dysplasia    6. Gastroesophageal reflux disease without esophagitis    7. Prostate cancer screening  -     PSA Screen; Future    8. Actinic keratoses  -     Cryotherapy, Skin Lesion    Other orders  -     cyclobenzaprine (FLEXERIL) 10 MG  tablet; Take 1 tablet by mouth 3 (Three) Times a Day As Needed for Muscle Spasms.  Dispense: 60 tablet; Refill: 5  -     omeprazole (priLOSEC) 40 MG capsule; Take 1 capsule by mouth Daily.  Dispense: 90 capsule; Refill: 3    1. Prevention  Overall, he remains in good health with reasonably good lifestyle habits. He moves fairly continually at work during the day, although I do think it would benefit him to have some strength training. Colonoscopy is due in 2028. He is fully vaccinated against COVID-19.    2. Hypertension  Blood pressure remains well controlled with nonpharmacologic therapy.    3. Hyperlipidemia  Apolipoprotein B is pending. If this is above 100, I will give him a year to try to get it down, but would consider statin therapy after that given his family history.    4. Prediabetes  Hemoglobin A1c is pending. The treatment remains healthy diet and avoidance of weight gain. He has lost weight since last visit by reducing simple carbohydrates.    5. GERD with Delgado's esophagus  Last EGD was done in 2020, due at 5 to 7 years.    6. Actinic keratoses  Treated with cryotherapy.    Follow up in 1 year.    BMI is >= 25 and <30. (Overweight) The following options were offered after discussion;: exercise counseling/recommendations and nutrition counseling/recommendations        Labs  Results for orders placed or performed in visit on 12/14/22   POC Rapid Strep A    Specimen: Swab   Result Value Ref Range    Rapid Strep A Screen Negative Negative, VALID, INVALID, Not Performed    Internal Control Passed Passed    Lot Number [id number]     Expiration Date 10-    POCT SARS-CoV-2 Antigen DINO    Specimen: Swab   Result Value Ref Range    SARS Antigen Not Detected Not Detected, Presumptive Negative    Influenza A Antigen DINO Not Detected Not Detected    Influenza B Antigen DINO Not Detected Not Detected    Internal Control Passed Passed    Lot Number [id number]     Expiration Date 03-         Counseling  was given to patient for the following topics: appropriate exercise 150 minutes per week, disease prevention and healthy eating habits.    Plan of care reviewed with patient at the conclusion of today's visit. Education was provided regarding diagnosis, management, and any prescribed or recommended OTC medications.Patient verbalizes understanding of and agreement with management plan.         Bong Veloz MD     Transcribed from ambient dictation for Bong Veloz MD by Madie Gillespie.  06/16/23   09:30 EDT    Patient or patient representative verbalized consent to the visit recording.  I have personally performed the services described in this document as transcribed by the above individual, and it is both accurate and complete.

## 2023-06-18 LAB — APO B SERPL-MCNC: 106 MG/DL

## 2023-06-19 DIAGNOSIS — R97.20 ABNORMAL PSA: Primary | ICD-10-CM

## 2023-09-06 ENCOUNTER — LAB (OUTPATIENT)
Dept: LAB | Facility: HOSPITAL | Age: 63
End: 2023-09-06
Payer: COMMERCIAL

## 2023-09-06 DIAGNOSIS — R97.20 ABNORMAL PSA: ICD-10-CM

## 2023-09-06 LAB — PSA SERPL-MCNC: 5.48 NG/ML (ref 0–4)

## 2023-09-06 PROCEDURE — 84153 ASSAY OF PSA TOTAL: CPT

## 2023-09-07 DIAGNOSIS — R97.20 ABNORMAL PSA: Primary | ICD-10-CM

## 2023-11-07 ENCOUNTER — TELEPHONE (OUTPATIENT)
Dept: GASTROENTEROLOGY | Facility: CLINIC | Age: 63
End: 2023-11-07
Payer: COMMERCIAL

## 2023-11-07 ENCOUNTER — TELEPHONE (OUTPATIENT)
Dept: GASTROENTEROLOGY | Facility: CLINIC | Age: 63
End: 2023-11-07

## 2023-11-07 NOTE — TELEPHONE ENCOUNTER
Hub staff attempted to follow warm transfer process and was unsuccessful     Caller: Kayode Cline    Relationship: Self    Best call back number: [phone]     What is the best time to reach you: ANY    Who are you requesting to speak with (clinical staff, provider,  specific staff member): SPECIFIC STAFF MEMBER    Do you know the name of the person who called: ROBB    What was the call regarding: RECALL    HE RECEIVED THE CALL FROM ROBB, BUT IS WANTING ANY EXPLANATION WHY THE RECALL IS FOR 5 YEARS. HE WANTS TO KNOW IF IT IS NECESSARY BECAUSE HIS LAST SEVERAL HAVE COME BACK CLEAR. HE SAID HE HAS NEVER HAD A POLYP REMOVED. PLEASE REVIEW AND CALL HIM BACK ANY TIME [phone]; OK TO LEAVE VOICEMAIL.

## 2023-11-07 NOTE — TELEPHONE ENCOUNTER
Provider: DR. WINCHESTER    Caller: JAKE DINH    Relationship to Patient: SELF     Phone Number: [phone]    Reason for Call: PT CALLED STATING THAT HE WOULD LIKE FOR SOMEONE IN THE CLINICAL STAFF TO REVIEW HIS CHART TO SEE IF THE RECALLED COLONOSCOPY IS NECESSARY// PLEASE CALL PT BACK

## 2024-04-29 ENCOUNTER — TELEPHONE (OUTPATIENT)
Dept: INTERNAL MEDICINE | Facility: CLINIC | Age: 64
End: 2024-04-29
Payer: COMMERCIAL

## 2024-05-02 ENCOUNTER — READMISSION MANAGEMENT (OUTPATIENT)
Dept: CALL CENTER | Facility: HOSPITAL | Age: 64
End: 2024-05-02
Payer: COMMERCIAL

## 2024-05-02 NOTE — OUTREACH NOTE
Prep Survey      Flowsheet Row Responses   [facility] patient discharged from? Non-[facility]   Is LACE score < 7 ? Non-BH Discharge   Eligibility [facility]   Date of Admission 05/01/24   Date of Discharge 05/02/24   Discharge Disposition Home or Self Care   Discharge diagnosis Hydronephrosis with obstructing calculus (Primary Dx),  Calculus of ureter   Does the patient have one of the following disease processes/diagnoses(primary or secondary)? Other   Does the patient have Home health ordered? No   Is there a DME ordered? No   Prep survey completed? Yes            HAYDEE AGOSTO - Registered Nurse

## 2024-05-03 ENCOUNTER — TRANSITIONAL CARE MANAGEMENT TELEPHONE ENCOUNTER (OUTPATIENT)
Dept: CALL CENTER | Facility: HOSPITAL | Age: 64
End: 2024-05-03
Payer: COMMERCIAL

## 2024-05-03 NOTE — OUTREACH NOTE
Call Center TCM Note      Flowsheet Row Responses   [facility] patient discharged from? Non-  []   Does the patient have one of the following disease processes/diagnoses(primary or secondary)? Other   TCM attempt successful? Yes   Call start time 0942   Call end time 0944   Discharge diagnosis Hydronephrosis with obstructing calculus (Primary Dx),  Calculus of ureter   Does the patient have all medications ordered at discharge? Yes   Is the patient taking all medications as directed (includes completed medication regime)? Yes   Does the patient have an appointment with their PCP within 7-14 days of discharge? No   Nursing Interventions Patient declined scheduling/rescheduling appointment at this time, Patient desires to follow up with specialty only   Has home health visited the patient within 72 hours of discharge? N/A   Psychosocial issues? No   Did the patient receive a copy of their discharge instructions? Yes   What is the patient's perception of their health status since discharge? Improving   Is the patient/caregiver able to teach back signs and symptoms related to disease process for when to call PCP? Yes   Is the patient/caregiver able to teach back signs and symptoms related to disease process for when to call 911? Yes   Is the patient/caregiver able to teach back the hierarchy of who to call/visit for symptoms/problems? PCP, Specialist, Home health nurse, Urgent Care, ED, 911 Yes   TCM call completed? Yes   Wrap up additional comments Doing well, no questions, declined to schedule a hospital f/u appt with PCP at this time and states he will be seeing urology for another procedure.   Call end time 0944   Would this patient benefit from a Referral to Amb Social Work? No   Is the patient interested in additional calls from an ambulatory ? No            Jessenia Reza RN    5/3/2024, 09:44 EDT

## 2024-05-09 ENCOUNTER — LAB (OUTPATIENT)
Dept: LAB | Facility: HOSPITAL | Age: 64
End: 2024-05-09
Payer: COMMERCIAL

## 2024-05-09 ENCOUNTER — OFFICE VISIT (OUTPATIENT)
Dept: INTERNAL MEDICINE | Facility: CLINIC | Age: 64
End: 2024-05-09
Payer: COMMERCIAL

## 2024-05-09 VITALS
DIASTOLIC BLOOD PRESSURE: 100 MMHG | HEIGHT: 70 IN | BODY MASS INDEX: 27.23 KG/M2 | WEIGHT: 190.2 LBS | HEART RATE: 95 BPM | SYSTOLIC BLOOD PRESSURE: 140 MMHG | TEMPERATURE: 97.7 F | OXYGEN SATURATION: 97 %

## 2024-05-09 DIAGNOSIS — R06.09 EXERTIONAL DYSPNEA: Primary | ICD-10-CM

## 2024-05-09 DIAGNOSIS — D64.9 ANEMIA, UNSPECIFIED TYPE: ICD-10-CM

## 2024-05-09 DIAGNOSIS — R06.09 EXERTIONAL DYSPNEA: ICD-10-CM

## 2024-05-09 DIAGNOSIS — R30.0 DYSURIA: ICD-10-CM

## 2024-05-09 DIAGNOSIS — R73.03 PREDIABETES: ICD-10-CM

## 2024-05-09 DIAGNOSIS — M79.10 MYALGIA: ICD-10-CM

## 2024-05-09 LAB
ALBUMIN SERPL-MCNC: 4.2 G/DL (ref 3.5–5.2)
ALBUMIN/GLOB SERPL: 1.2 G/DL
ALP SERPL-CCNC: 144 U/L (ref 39–117)
ALT SERPL W P-5'-P-CCNC: 28 U/L (ref 1–41)
ANION GAP SERPL CALCULATED.3IONS-SCNC: 13.4 MMOL/L (ref 5–15)
AST SERPL-CCNC: 18 U/L (ref 1–40)
BACTERIA UR QL AUTO: ABNORMAL /HPF
BASOPHILS # BLD AUTO: 0.04 10*3/MM3 (ref 0–0.2)
BASOPHILS NFR BLD AUTO: 0.4 % (ref 0–1.5)
BILIRUB SERPL-MCNC: 0.5 MG/DL (ref 0–1.2)
BILIRUB UR QL STRIP: NEGATIVE
BUN SERPL-MCNC: 14 MG/DL (ref 8–23)
BUN/CREAT SERPL: 10.3 (ref 7–25)
CALCIUM SPEC-SCNC: 9.5 MG/DL (ref 8.6–10.5)
CHLORIDE SERPL-SCNC: 99 MMOL/L (ref 98–107)
CLARITY UR: ABNORMAL
CO2 SERPL-SCNC: 23.6 MMOL/L (ref 22–29)
COLOR UR: YELLOW
CREAT SERPL-MCNC: 1.36 MG/DL (ref 0.76–1.27)
DEPRECATED RDW RBC AUTO: 40.8 FL (ref 37–54)
EGFRCR SERPLBLD CKD-EPI 2021: 58.8 ML/MIN/1.73
EOSINOPHIL # BLD AUTO: 0.27 10*3/MM3 (ref 0–0.4)
EOSINOPHIL NFR BLD AUTO: 2.9 % (ref 0.3–6.2)
ERYTHROCYTE [DISTWIDTH] IN BLOOD BY AUTOMATED COUNT: 13 % (ref 12.3–15.4)
GLOBULIN UR ELPH-MCNC: 3.6 GM/DL
GLUCOSE SERPL-MCNC: 86 MG/DL (ref 65–99)
GLUCOSE UR STRIP-MCNC: NEGATIVE MG/DL
HBA1C MFR BLD: 6 % (ref 4.8–5.6)
HCT VFR BLD AUTO: 41.2 % (ref 37.5–51)
HGB BLD-MCNC: 13.8 G/DL (ref 13–17.7)
HGB UR QL STRIP.AUTO: ABNORMAL
HOLD SPECIMEN: NORMAL
HYALINE CASTS UR QL AUTO: ABNORMAL /LPF
IMM GRANULOCYTES # BLD AUTO: 0.04 10*3/MM3 (ref 0–0.05)
IMM GRANULOCYTES NFR BLD AUTO: 0.4 % (ref 0–0.5)
IRON 24H UR-MRATE: 51 MCG/DL (ref 59–158)
IRON SATN MFR SERPL: 11 % (ref 20–50)
KETONES UR QL STRIP: NEGATIVE
LEUKOCYTE ESTERASE UR QL STRIP.AUTO: ABNORMAL
LYMPHOCYTES # BLD AUTO: 1.88 10*3/MM3 (ref 0.7–3.1)
LYMPHOCYTES NFR BLD AUTO: 20 % (ref 19.6–45.3)
MAGNESIUM SERPL-MCNC: 2.5 MG/DL (ref 1.6–2.4)
MCH RBC QN AUTO: 29.1 PG (ref 26.6–33)
MCHC RBC AUTO-ENTMCNC: 33.5 G/DL (ref 31.5–35.7)
MCV RBC AUTO: 86.9 FL (ref 79–97)
MONOCYTES # BLD AUTO: 0.96 10*3/MM3 (ref 0.1–0.9)
MONOCYTES NFR BLD AUTO: 10.2 % (ref 5–12)
NEUTROPHILS NFR BLD AUTO: 6.19 10*3/MM3 (ref 1.7–7)
NEUTROPHILS NFR BLD AUTO: 66.1 % (ref 42.7–76)
NITRITE UR QL STRIP: NEGATIVE
NRBC BLD AUTO-RTO: 0 /100 WBC (ref 0–0.2)
PH UR STRIP.AUTO: 6 [PH] (ref 5–8)
PLATELET # BLD AUTO: 490 10*3/MM3 (ref 140–450)
PMV BLD AUTO: 9.6 FL (ref 6–12)
POTASSIUM SERPL-SCNC: 4.1 MMOL/L (ref 3.5–5.2)
PROT SERPL-MCNC: 7.8 G/DL (ref 6–8.5)
PROT UR QL STRIP: ABNORMAL
RBC # BLD AUTO: 4.74 10*6/MM3 (ref 4.14–5.8)
RBC # UR STRIP: ABNORMAL /HPF
REF LAB TEST METHOD: ABNORMAL
SODIUM SERPL-SCNC: 136 MMOL/L (ref 136–145)
SP GR UR STRIP: 1.02 (ref 1–1.03)
SQUAMOUS #/AREA URNS HPF: ABNORMAL /HPF
TIBC SERPL-MCNC: 447 MCG/DL (ref 298–536)
TRANSFERRIN SERPL-MCNC: 300 MG/DL (ref 200–360)
UROBILINOGEN UR QL STRIP: ABNORMAL
WBC # UR STRIP: ABNORMAL /HPF
WBC NRBC COR # BLD AUTO: 9.38 10*3/MM3 (ref 3.4–10.8)

## 2024-05-09 PROCEDURE — 81001 URINALYSIS AUTO W/SCOPE: CPT

## 2024-05-09 PROCEDURE — 84466 ASSAY OF TRANSFERRIN: CPT

## 2024-05-09 PROCEDURE — 83540 ASSAY OF IRON: CPT

## 2024-05-09 PROCEDURE — 80053 COMPREHEN METABOLIC PANEL: CPT

## 2024-05-09 PROCEDURE — 85025 COMPLETE CBC W/AUTO DIFF WBC: CPT

## 2024-05-09 PROCEDURE — 93000 ELECTROCARDIOGRAM COMPLETE: CPT | Performed by: INTERNAL MEDICINE

## 2024-05-09 PROCEDURE — 83735 ASSAY OF MAGNESIUM: CPT

## 2024-05-09 PROCEDURE — 87086 URINE CULTURE/COLONY COUNT: CPT

## 2024-05-09 PROCEDURE — 36415 COLL VENOUS BLD VENIPUNCTURE: CPT

## 2024-05-09 PROCEDURE — 83036 HEMOGLOBIN GLYCOSYLATED A1C: CPT

## 2024-05-09 PROCEDURE — 99214 OFFICE O/P EST MOD 30 MIN: CPT | Performed by: INTERNAL MEDICINE

## 2024-05-09 RX ORDER — TAMSULOSIN HYDROCHLORIDE 0.4 MG/1
0.4 CAPSULE ORAL
COMMUNITY
Start: 2024-04-29

## 2024-05-09 RX ORDER — CEFDINIR 300 MG/1
300 CAPSULE ORAL
COMMUNITY
Start: 2024-04-29 | End: 2024-05-09

## 2024-05-11 LAB — BACTERIA SPEC AEROBE CULT: NO GROWTH

## 2024-06-13 RX ORDER — OMEPRAZOLE 40 MG/1
40 CAPSULE, DELAYED RELEASE ORAL DAILY
Qty: 90 CAPSULE | Refills: 0 | Status: SHIPPED | OUTPATIENT
Start: 2024-06-13

## 2024-06-18 ENCOUNTER — LAB (OUTPATIENT)
Dept: LAB | Facility: HOSPITAL | Age: 64
End: 2024-06-18
Payer: COMMERCIAL

## 2024-06-18 ENCOUNTER — OFFICE VISIT (OUTPATIENT)
Dept: INTERNAL MEDICINE | Facility: CLINIC | Age: 64
End: 2024-06-18
Payer: COMMERCIAL

## 2024-06-18 VITALS
HEART RATE: 67 BPM | HEIGHT: 70 IN | TEMPERATURE: 97.7 F | WEIGHT: 193 LBS | SYSTOLIC BLOOD PRESSURE: 130 MMHG | OXYGEN SATURATION: 99 % | DIASTOLIC BLOOD PRESSURE: 74 MMHG | BODY MASS INDEX: 27.63 KG/M2

## 2024-06-18 DIAGNOSIS — I10 PRIMARY HYPERTENSION: ICD-10-CM

## 2024-06-18 DIAGNOSIS — E78.00 HYPERCHOLESTEROLEMIA: ICD-10-CM

## 2024-06-18 DIAGNOSIS — R73.03 PREDIABETES: ICD-10-CM

## 2024-06-18 DIAGNOSIS — Z00.00 PREVENTATIVE HEALTH CARE: ICD-10-CM

## 2024-06-18 DIAGNOSIS — E78.00 HYPERCHOLESTEROLEMIA: Primary | ICD-10-CM

## 2024-06-18 LAB
CHOLEST SERPL-MCNC: 221 MG/DL (ref 0–200)
HDLC SERPL-MCNC: 41 MG/DL (ref 40–60)
LDLC SERPL CALC-MCNC: 132 MG/DL (ref 0–100)
LDLC/HDLC SERPL: 3.09 {RATIO}
TRIGL SERPL-MCNC: 266 MG/DL (ref 0–150)
VLDLC SERPL-MCNC: 48 MG/DL (ref 5–40)

## 2024-06-18 PROCEDURE — 82172 ASSAY OF APOLIPOPROTEIN: CPT

## 2024-06-18 PROCEDURE — 36415 COLL VENOUS BLD VENIPUNCTURE: CPT

## 2024-06-18 PROCEDURE — 80061 LIPID PANEL: CPT

## 2024-06-18 PROCEDURE — 99396 PREV VISIT EST AGE 40-64: CPT | Performed by: INTERNAL MEDICINE

## 2024-06-18 RX ORDER — AMLODIPINE BESYLATE 5 MG/1
5 TABLET ORAL DAILY
COMMUNITY
Start: 2024-05-13

## 2024-06-18 RX ORDER — NEBIVOLOL 5 MG/1
5 TABLET ORAL DAILY
COMMUNITY
Start: 2024-05-28

## 2024-06-18 RX ORDER — HYDRALAZINE HYDROCHLORIDE 25 MG/1
25 TABLET, FILM COATED ORAL 4 TIMES DAILY
COMMUNITY

## 2024-06-18 NOTE — PROGRESS NOTES
[facility]     Kayode Cline  1960   [id number]      Patient Care Team:  Bong Veloz MD as PCP - General    Chief Complaint::   Chief Complaint   Patient presents with    Annual Exam    Hypertension        HPI  History of Present Illness  The patient is a 63-year-old male who comes in for annual examination and for follow-up of hypertension, hyperlipidemia, and prediabetes. He is followed by GI for Delgado's esophagus, sleep medicine for sleep apnea, and urology for kidney stones and elevated PSA.    The patient underwent a sleep study, which revealed mild sleep apnea, albeit at the high-end of mild. Despite this, he reports no episodes of snoring. He acknowledges a history of mouth breathing and experiences claustrophobia, even during snorkeling activities. Despite feeling rested upon waking and sleeping for 8 hours, he does not experience morning fatigue.    The patient's urologist, Dr. Oliva, is unable to remove his stent until an echocardiogram is scheduled for 07/31/2024. He reports a sensation akin to an unusual sensation, which he attributes to the stent. He consulted with Dr. Robledo on [facility], who performed his first emergency surgery. He was scheduled to have the stent removed 10 days later, but was referred to a cardiologist who did not recommend surgery. He underwent a chest x-ray and EKG, both of which yielded normal results.    The patient was prescribed blood pressure medication, which he monitors daily for a period of 2 to 3 weeks. He is currently on 3 blood pressure medications. His blood pressure readings were consistently in the 150s to 160s when he began taking his blood pressure medication daily.    Supplemental Information  He has plantar fasciitis in his right foot. It is better right now because he is not doing anything that he enjoys. He is not hiking or playing basketball. He is doing a lot of stretching and massaging. Heat helps more than ice. In the mornings  after a shower, he lets his foot as hot as he can stand and then puts his shoes and socks on while his foot is still warm and that helps. If he walks around the building 3 to 4 times, he is going to start having pain. He has an appointment with his urologist on Monday for his PSA. He thinks he passed the kidney stone. He has not had an ounce of kidney stone pain since they did not put the stent in. His shortness of breath has improved.   His brother  of ALS.    Chronic Conditions:      Patient Active Problem List   Diagnosis    Delgado esophagus    GERD (gastroesophageal reflux disease)    Primary hypertension    Annual physical exam    Prediabetes    Non-seasonal allergic rhinitis due to pollen    Induratio penis plastica    Hypercholesterolemia    Abnormal PSA        Past Medical History:   Diagnosis Date    Delgado's esophagus     Esophageal candidiasis 2019    Inability to swallow 1/15/2018    Influenza A 1/15/2018       Past Surgical History:   Procedure Laterality Date    ENDOSCOPY      HERNIA REPAIR      Infant    PROSTATE BIOPSY  10/2020    normal    VASECTOMY  2002       Family History   Problem Relation Age of Onset    Diabetes Mother          - Age 88    Coronary artery disease Mother        Social History     Socioeconomic History    Marital status:    Tobacco Use    Smoking status: Never    Smokeless tobacco: Never   Substance and Sexual Activity    Alcohol use: No    Drug use: No    Sexual activity: Yes     Partners: Female     Birth control/protection: Surgical, Post-menopausal       No Known Allergies      Current Outpatient Medications:     amLODIPine (NORVASC) 5 MG tablet, Take 1 tablet by mouth Daily., Disp: , Rfl:     Cetirizine HCl 10 MG capsule, Take 1 tablet by mouth Daily As Needed., Disp: , Rfl:     cyclobenzaprine (FLEXERIL) 10 MG tablet, Take 1 tablet by mouth 3 (Three) Times a Day As Needed for Muscle Spasms., Disp: 60 tablet, Rfl: 5    ibuprofen  (ADVIL,MOTRIN) 200 MG tablet, Take 3 tablets by mouth Every 6 (Six) Hours As Needed. 3 x week, Disp: , Rfl:     nebivolol (BYSTOLIC) 5 MG tablet, Take 1 tablet by mouth Daily., Disp: , Rfl:     omeprazole (priLOSEC) 40 MG capsule, TAKE ONE CAPSULE BY MOUTH ONE TIME DAILY, Disp: 90 capsule, Rfl: 0    tamsulosin (FLOMAX) 0.4 MG capsule 24 hr capsule, Take 1 capsule by mouth., Disp: , Rfl:     hydrALAZINE (APRESOLINE) 25 MG tablet, Take 1 tablet by mouth 4 (Four) Times a Day., Disp: , Rfl:     Immunization History   Administered Date(s) Administered    COVID-19 (PFIZER) BIVALENT 12+YRS 11/14/2022    COVID-19 (PFIZER) Purple Cap Monovalent 03/01/2021, 03/29/2021, 11/29/2021    COVID-19 (UNSPECIFIED) 11/06/2023    COVID-19 F23 (PFIZER) 12YRS+ (COMIRNATY) 11/06/2023    Flu Vaccine Quad PF >36MO 08/25/2018, 09/15/2020    Fluad Quad 65+ 11/06/2023    Fluzone (or Fluarix & Flulaval for VFC) >6mos 09/09/2019, 09/15/2020    Fluzone Quad >6mos (Multi-dose) 02/29/2016, 09/16/2016, 10/26/2017, 09/02/2019, 11/29/2021    Hepatitis A 08/25/2018, 03/25/2019    Influenza Injectable Mdck Pf Quad 11/14/2022, 11/06/2023    Influenza, Unspecified 11/14/2022    Shingrix 06/04/2021, 11/08/2021        Health Maintenance Due   Topic Date Due    TDAP/TD VACCINES (1 - Tdap) Never done    RSV Vaccine - Adults (1 - 1-dose 60+ series) Never done    COVID-19 Vaccine (5 - 2023-24 season) 03/06/2024    ANNUAL PHYSICAL  06/16/2024    LIPID PANEL  06/16/2024    BMI FOLLOWUP  06/16/2024        Review of Systems   Constitutional: Negative.    Respiratory: Negative.  Negative for chest tightness and shortness of breath.    Cardiovascular: Negative.  Negative for chest pain.   Gastrointestinal:  Negative for abdominal pain, blood in stool, constipation and diarrhea.        Vital Signs  Vitals:    06/18/24 0802   BP: 130/74   BP Location: Left arm   Patient Position: Sitting   Cuff Size: Adult   Pulse: 67   Temp: 97.7 °F (36.5 °C)   TempSrc: Infrared  "  SpO2: 99%   Weight: 87.5 kg (193 lb)   Height: 177 cm (69.69\")   PainSc: 0-No pain       Physical Exam  Vitals and nursing note reviewed.   Constitutional:       Appearance: Normal appearance. He is well-developed.   HENT:      Head: Normocephalic and atraumatic.      Right Ear: Tympanic membrane, ear canal and external ear normal.      Left Ear: Tympanic membrane, ear canal and external ear normal.      Nose: Nose normal.      Mouth/Throat:      Pharynx: Oropharynx is clear. No oropharyngeal exudate or posterior oropharyngeal erythema.   Eyes:      Conjunctiva/sclera: Conjunctivae normal.      Pupils: Pupils are equal, round, and reactive to light.   Neck:      Thyroid: No thyromegaly.      Vascular: No carotid bruit or JVD.   Cardiovascular:      Rate and Rhythm: Normal rate and regular rhythm.      Heart sounds: Normal heart sounds. No murmur heard.     No friction rub. No gallop.   Pulmonary:      Effort: Pulmonary effort is normal. No respiratory distress.      Breath sounds: Normal breath sounds. No wheezing or rales.   Chest:      Chest wall: No tenderness.   Abdominal:      General: Bowel sounds are normal. There is no distension.      Palpations: Abdomen is soft. There is no mass.      Tenderness: There is no abdominal tenderness. There is no guarding or rebound.      Hernia: No hernia is present.   Musculoskeletal:         General: No tenderness. Normal range of motion.      Cervical back: Normal range of motion and neck supple.      Right lower leg: No edema.      Left lower leg: No edema.   Lymphadenopathy:      Cervical: No cervical adenopathy.   Skin:     General: Skin is warm and dry.      Findings: No erythema or rash.   Neurological:      Mental Status: He is alert and oriented to person, place, and time.      Cranial Nerves: No cranial nerve deficit.      Motor: No weakness or abnormal muscle tone.      Gait: Gait normal.   Psychiatric:         Mood and Affect: Mood normal.         Behavior: " Behavior normal.        Physical Exam      Procedures     Fall Risk Screen:  MARYJANE Fall Risk Assessment has not been completed.    Health Habits and Functional and Cognitive Screening:       No data to display                Depression Sreening  PHQ-9 Total Score:       ACE III MINI             Assessment/Plan:    Assessment & Plan  1. Prevention.  The patient, unfortunately, has experienced several new health issues this year, including kidney stones, elevated PSA, shortness of breath, and sleep apnea. He is current with his colorectal cancer screening.    2. Hypertension.  The patient's blood pressure is currently well-managed with hydralazine, bisoprolol, and amlodipine.    3. Hyperlipidemia.  Last year's lipids were significantly elevated, with an April be over 100. If there is no improvement, consideration will be given to resuming statin therapy.    4. Prediabetes.  The patient's recent A1c was 6.0. We have discussed that this is in the prediabetic range, and the method to prevent progression to foreblong diabetes is to restrict carbs and lose weight.    5. Abnormal PSA and kidney stones.  A follow-up with urology has been recommended.    6. Shortness of breath.  This condition appears to have resolved. An echocardiogram has been scheduled at the [facility].    7. Plantar fasciitis on the left foot.  The patient has been diligently performing stretching, massage, and heat. He has also noted improvement in the arch support in his shoes. However, he continues to experience limitations, including heel pain when walking short distances. Once all other issues have been addressed, a referral to physical therapy has been suggested. He will inform me when he is ready for this.    Follow-up  A follow-up visit is scheduled for 1 year from now.        Labs  Results  Laboratory Studies  A1c was 6.0.    Testing  Sleep study showed mild sleep apnea.    Counseling was given to patient for the following topics: appropriate  exercise 150 minutes/week, disease prevention, and healthy eating habits.    Plan of care reviewed with patient at the conclusion of today's visit. Education was provided regarding diagnosis, management, and any prescribed or recommended OTC medications.Patient verbalizes understanding of and agreement with management plan.     Patient or patient representative verbalized consent for the use of Ambient Listening during the visit with  Bong Veloz MD for chart documentation. 6/18/2024  08:59 EDT        Chandrika Deleon LPN

## 2024-06-21 DIAGNOSIS — E78.00 HYPERCHOLESTEROLEMIA: Primary | ICD-10-CM

## 2024-06-21 LAB — APO B SERPL-MCNC: 124 MG/DL

## 2024-06-21 RX ORDER — ROSUVASTATIN CALCIUM 10 MG/1
10 TABLET, COATED ORAL DAILY
Qty: 90 TABLET | Refills: 3 | Status: SHIPPED | OUTPATIENT
Start: 2024-06-21 | End: 2024-06-24 | Stop reason: SDUPTHER

## 2024-06-24 RX ORDER — ROSUVASTATIN CALCIUM 10 MG/1
10 TABLET, COATED ORAL DAILY
Qty: 90 TABLET | Refills: 3 | Status: SHIPPED | OUTPATIENT
Start: 2024-06-24

## 2024-07-25 RX ORDER — NEBIVOLOL 5 MG/1
5 TABLET ORAL DAILY
Qty: 30 TABLET | Refills: 0 | Status: SHIPPED | OUTPATIENT
Start: 2024-07-25

## 2024-09-10 RX ORDER — OMEPRAZOLE 40 MG/1
40 CAPSULE, DELAYED RELEASE ORAL DAILY
Qty: 90 CAPSULE | Refills: 0 | Status: SHIPPED | OUTPATIENT
Start: 2024-09-10

## 2024-10-07 ENCOUNTER — LAB (OUTPATIENT)
Dept: LAB | Facility: HOSPITAL | Age: 64
End: 2024-10-07
Payer: COMMERCIAL

## 2024-10-07 DIAGNOSIS — E78.00 HYPERCHOLESTEROLEMIA: ICD-10-CM

## 2024-10-07 LAB
ALBUMIN SERPL-MCNC: 4.5 G/DL (ref 3.5–5.2)
ALBUMIN/GLOB SERPL: 1.7 G/DL
ALP SERPL-CCNC: 91 U/L (ref 39–117)
ALT SERPL W P-5'-P-CCNC: 20 U/L (ref 1–41)
ANION GAP SERPL CALCULATED.3IONS-SCNC: 9.4 MMOL/L (ref 5–15)
AST SERPL-CCNC: 18 U/L (ref 1–40)
BILIRUB SERPL-MCNC: 0.5 MG/DL (ref 0–1.2)
BUN SERPL-MCNC: 9 MG/DL (ref 8–23)
BUN/CREAT SERPL: 8 (ref 7–25)
CALCIUM SPEC-SCNC: 9.3 MG/DL (ref 8.6–10.5)
CHLORIDE SERPL-SCNC: 103 MMOL/L (ref 98–107)
CHOLEST SERPL-MCNC: 147 MG/DL (ref 0–200)
CO2 SERPL-SCNC: 26.6 MMOL/L (ref 22–29)
CREAT SERPL-MCNC: 1.13 MG/DL (ref 0.76–1.27)
EGFRCR SERPLBLD CKD-EPI 2021: 73 ML/MIN/1.73
GLOBULIN UR ELPH-MCNC: 2.7 GM/DL
GLUCOSE SERPL-MCNC: 96 MG/DL (ref 65–99)
HDLC SERPL-MCNC: 35 MG/DL (ref 40–60)
LDLC SERPL CALC-MCNC: 77 MG/DL (ref 0–100)
LDLC/HDLC SERPL: 2.02 {RATIO}
POTASSIUM SERPL-SCNC: 3.9 MMOL/L (ref 3.5–5.2)
PROT SERPL-MCNC: 7.2 G/DL (ref 6–8.5)
SODIUM SERPL-SCNC: 139 MMOL/L (ref 136–145)
TRIGL SERPL-MCNC: 206 MG/DL (ref 0–150)
VLDLC SERPL-MCNC: 35 MG/DL (ref 5–40)

## 2024-10-07 PROCEDURE — 80053 COMPREHEN METABOLIC PANEL: CPT

## 2024-10-07 PROCEDURE — 80061 LIPID PANEL: CPT

## 2024-10-07 PROCEDURE — 82172 ASSAY OF APOLIPOPROTEIN: CPT

## 2024-10-07 PROCEDURE — 36415 COLL VENOUS BLD VENIPUNCTURE: CPT

## 2024-10-09 LAB — APO B SERPL-MCNC: 82 MG/DL

## 2024-12-05 RX ORDER — OMEPRAZOLE 40 MG/1
40 CAPSULE, DELAYED RELEASE ORAL DAILY
Qty: 90 CAPSULE | Refills: 0 | Status: SHIPPED | OUTPATIENT
Start: 2024-12-05

## 2025-03-04 RX ORDER — OMEPRAZOLE 40 MG/1
40 CAPSULE, DELAYED RELEASE ORAL DAILY
Qty: 90 CAPSULE | Refills: 0 | Status: SHIPPED | OUTPATIENT
Start: 2025-03-04

## 2025-03-04 NOTE — TELEPHONE ENCOUNTER
Rx Refill Note  Requested Prescriptions     Pending Prescriptions Disp Refills    omeprazole (priLOSEC) 40 MG capsule [Pharmacy Med Name: Omeprazole Oral Capsule Delayed Release 40 MG] 90 capsule 0     Sig: TAKE ONE CAPSULE BY MOUTH ONE TIME DAILY      Last office visit with prescribing clinician: 6/18/2024   Last telemedicine visit with prescribing clinician: Visit date not found   Next office visit with prescribing clinician: 6/24/2025                         Would you like a call back once the refill request has been completed: [] Yes [] No    If the office needs to give you a call back, can they leave a voicemail: [] Yes [] No    Dary Alvarez MA  03/04/25, 10:58 EST

## 2025-05-20 RX ORDER — ROSUVASTATIN CALCIUM 10 MG/1
10 TABLET, COATED ORAL DAILY
Qty: 90 TABLET | Refills: 0 | Status: SHIPPED | OUTPATIENT
Start: 2025-05-20

## 2025-06-03 RX ORDER — OMEPRAZOLE 40 MG/1
40 CAPSULE, DELAYED RELEASE ORAL DAILY
Qty: 90 CAPSULE | Refills: 0 | Status: SHIPPED | OUTPATIENT
Start: 2025-06-03

## 2025-06-24 ENCOUNTER — OFFICE VISIT (OUTPATIENT)
Dept: INTERNAL MEDICINE | Facility: CLINIC | Age: 65
End: 2025-06-24
Payer: COMMERCIAL

## 2025-06-24 ENCOUNTER — LAB (OUTPATIENT)
Dept: LAB | Facility: HOSPITAL | Age: 65
End: 2025-06-24
Payer: COMMERCIAL

## 2025-06-24 VITALS
HEART RATE: 67 BPM | DIASTOLIC BLOOD PRESSURE: 76 MMHG | HEIGHT: 70 IN | OXYGEN SATURATION: 99 % | WEIGHT: 194 LBS | TEMPERATURE: 98.2 F | SYSTOLIC BLOOD PRESSURE: 126 MMHG | BODY MASS INDEX: 27.77 KG/M2

## 2025-06-24 DIAGNOSIS — R73.03 PREDIABETES: ICD-10-CM

## 2025-06-24 DIAGNOSIS — Z00.00 ANNUAL PHYSICAL EXAM: Primary | ICD-10-CM

## 2025-06-24 DIAGNOSIS — I10 PRIMARY HYPERTENSION: ICD-10-CM

## 2025-06-24 DIAGNOSIS — E78.00 HYPERCHOLESTEROLEMIA: ICD-10-CM

## 2025-06-24 LAB
ALBUMIN SERPL-MCNC: 4.4 G/DL (ref 3.5–5.2)
ALBUMIN UR-MCNC: <1.2 MG/DL
ALBUMIN/GLOB SERPL: 1.7 G/DL
ALP SERPL-CCNC: 84 U/L (ref 39–117)
ALT SERPL W P-5'-P-CCNC: 15 U/L (ref 1–41)
ANION GAP SERPL CALCULATED.3IONS-SCNC: 12 MMOL/L (ref 5–15)
AST SERPL-CCNC: 21 U/L (ref 1–40)
BASOPHILS # BLD AUTO: 0.03 10*3/MM3 (ref 0–0.2)
BASOPHILS NFR BLD AUTO: 0.6 % (ref 0–1.5)
BILIRUB SERPL-MCNC: 0.6 MG/DL (ref 0–1.2)
BUN SERPL-MCNC: 14 MG/DL (ref 8–23)
BUN/CREAT SERPL: 14.6 (ref 7–25)
CALCIUM SPEC-SCNC: 9.2 MG/DL (ref 8.6–10.5)
CHLORIDE SERPL-SCNC: 104 MMOL/L (ref 98–107)
CHOLEST SERPL-MCNC: 154 MG/DL (ref 0–200)
CO2 SERPL-SCNC: 24 MMOL/L (ref 22–29)
CREAT SERPL-MCNC: 0.96 MG/DL (ref 0.76–1.27)
CREAT UR-MCNC: 183.7 MG/DL
DEPRECATED RDW RBC AUTO: 43.8 FL (ref 37–54)
EGFRCR SERPLBLD CKD-EPI 2021: 88.8 ML/MIN/1.73
EOSINOPHIL # BLD AUTO: 0.09 10*3/MM3 (ref 0–0.4)
EOSINOPHIL NFR BLD AUTO: 1.7 % (ref 0.3–6.2)
ERYTHROCYTE [DISTWIDTH] IN BLOOD BY AUTOMATED COUNT: 13.9 % (ref 12.3–15.4)
GLOBULIN UR ELPH-MCNC: 2.6 GM/DL
GLUCOSE SERPL-MCNC: 104 MG/DL (ref 65–99)
HBA1C MFR BLD: 6 % (ref 4.8–5.6)
HCT VFR BLD AUTO: 42.2 % (ref 37.5–51)
HDLC SERPL-MCNC: 41 MG/DL (ref 40–60)
HGB BLD-MCNC: 13.8 G/DL (ref 13–17.7)
IMM GRANULOCYTES # BLD AUTO: 0.02 10*3/MM3 (ref 0–0.05)
IMM GRANULOCYTES NFR BLD AUTO: 0.4 % (ref 0–0.5)
LDLC SERPL CALC-MCNC: 86 MG/DL (ref 0–100)
LDLC/HDLC SERPL: 2.01 {RATIO}
LYMPHOCYTES # BLD AUTO: 1.68 10*3/MM3 (ref 0.7–3.1)
LYMPHOCYTES NFR BLD AUTO: 31.6 % (ref 19.6–45.3)
MCH RBC QN AUTO: 28.9 PG (ref 26.6–33)
MCHC RBC AUTO-ENTMCNC: 32.7 G/DL (ref 31.5–35.7)
MCV RBC AUTO: 88.5 FL (ref 79–97)
MICROALBUMIN/CREAT UR: NORMAL MG/G{CREAT}
MONOCYTES # BLD AUTO: 0.48 10*3/MM3 (ref 0.1–0.9)
MONOCYTES NFR BLD AUTO: 9 % (ref 5–12)
NEUTROPHILS NFR BLD AUTO: 3.02 10*3/MM3 (ref 1.7–7)
NEUTROPHILS NFR BLD AUTO: 56.7 % (ref 42.7–76)
NRBC BLD AUTO-RTO: 0 /100 WBC (ref 0–0.2)
PLATELET # BLD AUTO: 261 10*3/MM3 (ref 140–450)
PMV BLD AUTO: 10.1 FL (ref 6–12)
POTASSIUM SERPL-SCNC: 3.8 MMOL/L (ref 3.5–5.2)
PROT SERPL-MCNC: 7 G/DL (ref 6–8.5)
RBC # BLD AUTO: 4.77 10*6/MM3 (ref 4.14–5.8)
SODIUM SERPL-SCNC: 140 MMOL/L (ref 136–145)
TRIGL SERPL-MCNC: 152 MG/DL (ref 0–150)
VLDLC SERPL-MCNC: 27 MG/DL (ref 5–40)
WBC NRBC COR # BLD AUTO: 5.32 10*3/MM3 (ref 3.4–10.8)

## 2025-06-24 PROCEDURE — 99396 PREV VISIT EST AGE 40-64: CPT | Performed by: INTERNAL MEDICINE

## 2025-06-24 PROCEDURE — 83036 HEMOGLOBIN GLYCOSYLATED A1C: CPT

## 2025-06-24 PROCEDURE — 80053 COMPREHEN METABOLIC PANEL: CPT

## 2025-06-24 PROCEDURE — 82570 ASSAY OF URINE CREATININE: CPT

## 2025-06-24 PROCEDURE — 82172 ASSAY OF APOLIPOPROTEIN: CPT

## 2025-06-24 PROCEDURE — 36415 COLL VENOUS BLD VENIPUNCTURE: CPT

## 2025-06-24 PROCEDURE — 82043 UR ALBUMIN QUANTITATIVE: CPT

## 2025-06-24 PROCEDURE — 85025 COMPLETE CBC W/AUTO DIFF WBC: CPT

## 2025-06-24 PROCEDURE — 80061 LIPID PANEL: CPT

## 2025-06-24 RX ORDER — TADALAFIL 5 MG/1
5 TABLET ORAL DAILY PRN
Start: 2025-06-24

## 2025-06-24 RX ORDER — METOPROLOL SUCCINATE 50 MG/1
50 TABLET, EXTENDED RELEASE ORAL DAILY
Start: 2025-06-24

## 2025-06-24 NOTE — PROGRESS NOTES
[facility]     Kayode Cline  1960   [id number]      Patient Care Team:  Bong Veloz MD as PCP - General    Chief Complaint::   Chief Complaint   Patient presents with    Annual Exam    Hypertension        HPI  History of Present Illness  The patient presents for an annual examination and follow-up of hypertension, hyperlipidemia, and prediabetes.    He has been managing his blood pressure with a regimen of three different medications, taken four times daily, totaling six pills per day. He finds it challenging to adhere to this schedule, particularly on weekends when his routine varies. He is currently taking hydralazine, amlodipine, and metoprolol succinate 50 mg once daily.    He was started on Crestor last year and was advised to have his blood work checked after a year.    He has a history of benign polyps, which have been regularly removed. He was informed that he could postpone his next colonoscopy until he reached the age of 66. He is due for a PSA test.    He has been experiencing tightness in his fingers, which he attributes to age and arthritis. He also reports foot pain at rest, which he believes is a residual symptom from a previous plantar fasciitis issue that lasted over a year. This condition caused severe pain, to the point where he felt the need for crutches on some days. The pain has improved over the past two to three months.    He is on Cialis 5 mg once a day for erectile dysfunction. He takes an allergy pill and omeprazole.    PAST SURGICAL HISTORY:  He had a stent removed after passing a kidney stone. He has undergone multiple colonoscopies due to a family history of polyps.     FAMILY HISTORY  His mother had polyps removed and a history of heart disease, including a pig valve replacement and a pacemaker. His father had cancer at the age of 94.    Chronic Conditions:      Patient Active Problem List   Diagnosis    Delgado esophagus    GERD (gastroesophageal reflux  disease)    Primary hypertension    Annual physical exam    Prediabetes    Non-seasonal allergic rhinitis due to pollen    Induratio penis plastica    Hypercholesterolemia    Abnormal PSA        Past Medical History:   Diagnosis Date    Delgado's esophagus 2008    Esophageal candidiasis 2019    Hypertension     Inability to swallow 01/15/2018    Influenza A 01/15/2018    Kidney stone     resolved       Past Surgical History:   Procedure Laterality Date    ENDOSCOPY      HERNIA REPAIR  89    Infant    PROSTATE BIOPSY  10/2020    normal    VASECTOMY  2002       Family History   Problem Relation Age of Onset    Diabetes Mother          - Age 88    Coronary artery disease Mother        Social History     Socioeconomic History    Marital status:    Tobacco Use    Smoking status: Never    Smokeless tobacco: Never   Vaping Use    Vaping status: Never Used   Substance and Sexual Activity    Alcohol use: No    Drug use: No    Sexual activity: Yes     Partners: Female     Birth control/protection: Post-menopausal, Surgical       No Known Allergies      Current Outpatient Medications:     amLODIPine (NORVASC) 5 MG tablet, Take 1 tablet by mouth Daily., Disp: , Rfl:     Cetirizine HCl 10 MG capsule, Take 1 tablet by mouth Daily As Needed., Disp: , Rfl:     cyclobenzaprine (FLEXERIL) 10 MG tablet, Take 1 tablet by mouth 3 (Three) Times a Day As Needed for Muscle Spasms., Disp: 60 tablet, Rfl: 5    hydrALAZINE (APRESOLINE) 25 MG tablet, Take 1 tablet by mouth 4 (Four) Times a Day., Disp: , Rfl:     ibuprofen (ADVIL,MOTRIN) 200 MG tablet, Take 3 tablets by mouth Every 6 (Six) Hours As Needed. 3 x week, Disp: , Rfl:     omeprazole (priLOSEC) 40 MG capsule, TAKE ONE CAPSULE BY MOUTH ONE TIME DAILY, Disp: 90 capsule, Rfl: 0    rosuvastatin (CRESTOR) 10 MG tablet, TAKE ONE TABLET BY MOUTH ONE TIME DAILY, Disp: 90 tablet, Rfl: 0    metoprolol succinate XL (Toprol XL) 50 MG 24 hr tablet, Take 1 tablet by mouth  "Daily., Disp: , Rfl:     tadalafil (Cialis) 5 MG tablet, Take 1 tablet by mouth Daily As Needed for Erectile Dysfunction., Disp: , Rfl:     Immunization History   Administered Date(s) Administered    COVID-19 (PFIZER) 12YRS+ (COMIRNATY) 11/06/2023, 09/30/2024    COVID-19 (PFIZER) BIVALENT 12+YRS 11/14/2022    COVID-19 (PFIZER) Purple Cap Monovalent 03/01/2021, 03/29/2021, 11/29/2021    COVID-19 (UNSPECIFIED) 11/06/2023    Flu Vaccine Quad PF >36MO 08/25/2018, 09/15/2020    Fluad Quad 65+ 11/06/2023    Fluzone  >6mos 09/30/2024    Fluzone (or Fluarix & Flulaval for VFC) >6mos 08/25/2018, 09/09/2019, 09/15/2020, 11/29/2021    Fluzone Quad >6mos (Multi-dose) 02/29/2016, 09/16/2016, 10/26/2017, 09/02/2019, 11/29/2021    Hepatitis A 08/25/2018, 03/25/2019    Influenza Injectable Mdck Pf Quad 11/14/2022, 11/06/2023    Influenza, Unspecified 11/14/2022    Shingrix 06/04/2021, 11/08/2021        Health Maintenance Due   Topic Date Due    TDAP/TD VACCINES (1 - Tdap) Never done    Pneumococcal Vaccine 50+ (1 of 1 - PCV) Never done    ANNUAL PHYSICAL  06/18/2025        Review of Systems   Constitutional: Negative.    Respiratory: Negative.  Negative for chest tightness and shortness of breath.    Cardiovascular: Negative.  Negative for chest pain.   Gastrointestinal:  Negative for abdominal pain, blood in stool, constipation and diarrhea.        Vital Signs  Vitals:    06/24/25 0801   BP: 126/76   BP Location: Left arm   Patient Position: Sitting   Cuff Size: Adult   Pulse: 67   Temp: 98.2 °F (36.8 °C)   TempSrc: Infrared   SpO2: 99%   Weight: 88 kg (194 lb)   Height: 177 cm (69.69\")   PainSc: 0-No pain       Physical Exam  Vitals reviewed.   Constitutional:       Appearance: Normal appearance. He is well-developed.   HENT:      Head: Normocephalic and atraumatic.      Right Ear: Tympanic membrane and ear canal normal.      Left Ear: Tympanic membrane and ear canal normal.      Mouth/Throat:      Pharynx: Oropharynx is clear. " No posterior oropharyngeal erythema.   Eyes:      Extraocular Movements: Extraocular movements intact.      Conjunctiva/sclera: Conjunctivae normal.      Pupils: Pupils are equal, round, and reactive to light.   Neck:      Thyroid: No thyromegaly.      Vascular: No carotid bruit.   Cardiovascular:      Rate and Rhythm: Normal rate and regular rhythm.      Heart sounds: Normal heart sounds. No murmur heard.     No friction rub. No gallop.   Pulmonary:      Effort: Pulmonary effort is normal.      Breath sounds: Normal breath sounds.   Abdominal:      General: Bowel sounds are normal.      Palpations: Abdomen is soft.      Tenderness: There is no abdominal tenderness.   Musculoskeletal:      Cervical back: Normal range of motion and neck supple.      Right lower leg: No edema.      Left lower leg: No edema.   Lymphadenopathy:      Cervical: No cervical adenopathy.   Skin:     General: Skin is warm and dry.   Neurological:      Mental Status: He is alert and oriented to person, place, and time.      Cranial Nerves: No cranial nerve deficit.      Motor: No weakness.      Gait: Gait normal.   Psychiatric:         Mood and Affect: Mood normal.         Behavior: Behavior normal.        Physical Exam  Cardiovascular: Heart rate is 67.    Procedures     Fall Risk Screen:  STEADI Fall Risk Assessment has not been completed.    Health Habits and Functional and Cognitive Screening:       No data to display                Depression Sreening  PHQ-9 Total Score:      ACE III MINI             Assessment/Plan:      Assessment & Plan  1. Health maintenance.  He appears to be doing well overall. He was due for a colonoscopy 2 years ago according to the gastroenterologist's note. He says that he was told that he could repeat it when he turned 66. He prefers to have this done in the [facility] system, so we will discuss with urology about a referral. He will continue follow up with urology at the [facility] for evaluation of abnormal  PSA.    2. Hypertension.  Blood pressure is well controlled. He does not like the three-drug regimen, particularly the four times daily hydralazine dose. Discussed ways to reduce his pill burden, but it would require frequent monitoring and some uncertainty and possible new side effects. At present, he is tolerating this regimen. He will continue amlodipine 5 mg daily, metoprolol succinate 50 mg daily, and hydralazine 25 mg four times a day.    3. Hyperlipidemia.  Lipid panel is pending. Continue rosuvastatin and healthy diet.    4. Abnormal glucose.  A1c is pending. The treatment remains healthy diet and avoidance of weight gain.    Follow-up  The patient will follow up in 1 year.      Labs  Results      Counseling was given to patient for the following topics: appropriate exercise 150 minutes per week, disease prevention, and healthy eating habits.    Plan of care reviewed with patient at the conclusion of today's visit. Education was provided regarding diagnosis, management, and any prescribed or recommended OTC medications.Patient verbalizes understanding of and agreement with management plan.     Patient or patient representative verbalized consent for the use of Ambient Listening during the visit with  Bong Veloz MD for chart documentation. 6/26/2025  20:28 EDT        Bong Veloz MD

## 2025-06-27 LAB — APO B SERPL-MCNC: 82 MG/DL

## 2025-07-10 DIAGNOSIS — K22.70 BARRETT'S ESOPHAGUS WITHOUT DYSPLASIA: Primary | ICD-10-CM

## 2025-08-22 RX ORDER — ROSUVASTATIN CALCIUM 10 MG/1
10 TABLET, COATED ORAL DAILY
Qty: 90 TABLET | Refills: 0 | Status: SHIPPED | OUTPATIENT
Start: 2025-08-22

## 2025-08-25 RX ORDER — TADALAFIL 5 MG/1
5 TABLET ORAL DAILY
Qty: 90 TABLET | Refills: 0 | Status: SHIPPED | OUTPATIENT
Start: 2025-08-25